# Patient Record
Sex: MALE | Race: WHITE | NOT HISPANIC OR LATINO | URBAN - METROPOLITAN AREA
[De-identification: names, ages, dates, MRNs, and addresses within clinical notes are randomized per-mention and may not be internally consistent; named-entity substitution may affect disease eponyms.]

---

## 2017-01-24 ENCOUNTER — OUTPATIENT (OUTPATIENT)
Dept: OUTPATIENT SERVICES | Facility: HOSPITAL | Age: 72
LOS: 1 days | End: 2017-01-24
Payer: MEDICARE

## 2017-01-24 ENCOUNTER — APPOINTMENT (OUTPATIENT)
Dept: RADIOLOGY | Facility: HOSPITAL | Age: 72
End: 2017-01-24

## 2017-01-24 DIAGNOSIS — R06.2 WHEEZING: ICD-10-CM

## 2017-01-24 DIAGNOSIS — R05 COUGH: ICD-10-CM

## 2017-01-24 PROCEDURE — 71046 X-RAY EXAM CHEST 2 VIEWS: CPT

## 2017-01-24 PROCEDURE — 71020: CPT | Mod: 26

## 2017-03-16 ENCOUNTER — OUTPATIENT (OUTPATIENT)
Dept: OUTPATIENT SERVICES | Facility: HOSPITAL | Age: 72
LOS: 1 days | End: 2017-03-16
Payer: MEDICARE

## 2017-03-16 ENCOUNTER — APPOINTMENT (OUTPATIENT)
Dept: CT IMAGING | Facility: HOSPITAL | Age: 72
End: 2017-03-16

## 2017-03-16 DIAGNOSIS — J44.9 CHRONIC OBSTRUCTIVE PULMONARY DISEASE, UNSPECIFIED: ICD-10-CM

## 2017-03-16 PROCEDURE — 71250 CT THORAX DX C-: CPT

## 2018-04-17 ENCOUNTER — INPATIENT (INPATIENT)
Facility: HOSPITAL | Age: 73
LOS: 1 days | Discharge: HOME | DRG: 194 | End: 2018-04-19
Attending: INTERNAL MEDICINE | Admitting: HOSPITALIST
Payer: MEDICARE

## 2018-04-17 DIAGNOSIS — J44.1 CHRONIC OBSTRUCTIVE PULMONARY DISEASE WITH (ACUTE) EXACERBATION: ICD-10-CM

## 2018-04-17 DIAGNOSIS — J10.1 INFLUENZA DUE TO OTHER IDENTIFIED INFLUENZA VIRUS WITH OTHER RESPIRATORY MANIFESTATIONS: ICD-10-CM

## 2018-04-17 DIAGNOSIS — K51.90 ULCERATIVE COLITIS, UNSPECIFIED, WITHOUT COMPLICATIONS: ICD-10-CM

## 2018-04-17 DIAGNOSIS — Z87.891 PERSONAL HISTORY OF NICOTINE DEPENDENCE: ICD-10-CM

## 2018-04-17 DIAGNOSIS — R73.9 HYPERGLYCEMIA, UNSPECIFIED: ICD-10-CM

## 2018-04-17 DIAGNOSIS — G62.9 POLYNEUROPATHY, UNSPECIFIED: ICD-10-CM

## 2018-04-17 DIAGNOSIS — I10 ESSENTIAL (PRIMARY) HYPERTENSION: ICD-10-CM

## 2018-04-17 DIAGNOSIS — N40.0 BENIGN PROSTATIC HYPERPLASIA WITHOUT LOWER URINARY TRACT SYMPTOMS: ICD-10-CM

## 2018-04-17 DIAGNOSIS — E87.6 HYPOKALEMIA: ICD-10-CM

## 2018-04-17 PROCEDURE — 93010 ELECTROCARDIOGRAM REPORT: CPT

## 2018-04-17 PROCEDURE — 71045 X-RAY EXAM CHEST 1 VIEW: CPT | Mod: 26

## 2018-04-17 PROCEDURE — 99223 1ST HOSP IP/OBS HIGH 75: CPT

## 2018-04-18 PROCEDURE — 99233 SBSQ HOSP IP/OBS HIGH 50: CPT

## 2018-04-19 PROCEDURE — 80048 BASIC METABOLIC PNL TOTAL CA: CPT

## 2018-04-19 PROCEDURE — 99239 HOSP IP/OBS DSCHRG MGMT >30: CPT

## 2018-04-19 PROCEDURE — 85027 COMPLETE CBC AUTOMATED: CPT

## 2018-04-19 PROCEDURE — 85025 COMPLETE CBC W/AUTO DIFF WBC: CPT

## 2018-04-19 PROCEDURE — 87040 BLOOD CULTURE FOR BACTERIA: CPT

## 2018-04-19 PROCEDURE — 96374 THER/PROPH/DIAG INJ IV PUSH: CPT

## 2018-04-19 PROCEDURE — 80076 HEPATIC FUNCTION PANEL: CPT

## 2018-04-19 PROCEDURE — 93005 ELECTROCARDIOGRAM TRACING: CPT

## 2018-04-19 PROCEDURE — 85610 PROTHROMBIN TIME: CPT

## 2018-04-19 PROCEDURE — 83880 ASSAY OF NATRIURETIC PEPTIDE: CPT

## 2018-04-19 PROCEDURE — 82550 ASSAY OF CK (CPK): CPT

## 2018-04-19 PROCEDURE — 94640 AIRWAY INHALATION TREATMENT: CPT

## 2018-04-19 PROCEDURE — 82803 BLOOD GASES ANY COMBINATION: CPT

## 2018-04-19 PROCEDURE — 87486 CHLMYD PNEUM DNA AMP PROBE: CPT

## 2018-04-19 PROCEDURE — 87400 INFLUENZA A/B EACH AG IA: CPT

## 2018-04-19 PROCEDURE — 85730 THROMBOPLASTIN TIME PARTIAL: CPT

## 2018-04-19 PROCEDURE — 87581 M.PNEUMON DNA AMP PROBE: CPT

## 2018-04-19 PROCEDURE — 82553 CREATINE MB FRACTION: CPT

## 2018-04-19 PROCEDURE — 97161 PT EVAL LOW COMPLEX 20 MIN: CPT

## 2018-04-19 PROCEDURE — 80053 COMPREHEN METABOLIC PANEL: CPT

## 2018-04-19 PROCEDURE — 99285 EMERGENCY DEPT VISIT HI MDM: CPT | Mod: 25

## 2018-04-19 PROCEDURE — 71045 X-RAY EXAM CHEST 1 VIEW: CPT

## 2018-04-19 PROCEDURE — 84484 ASSAY OF TROPONIN QUANT: CPT

## 2018-04-19 PROCEDURE — 96361 HYDRATE IV INFUSION ADD-ON: CPT

## 2018-04-19 PROCEDURE — 96375 TX/PRO/DX INJ NEW DRUG ADDON: CPT

## 2018-04-19 PROCEDURE — 87633 RESP VIRUS 12-25 TARGETS: CPT

## 2018-05-03 ENCOUNTER — OUTPATIENT (OUTPATIENT)
Dept: OUTPATIENT SERVICES | Facility: HOSPITAL | Age: 73
LOS: 1 days | End: 2018-05-03
Payer: MEDICARE

## 2018-05-03 ENCOUNTER — APPOINTMENT (OUTPATIENT)
Dept: CT IMAGING | Facility: HOSPITAL | Age: 73
End: 2018-05-03
Payer: MEDICARE

## 2018-05-03 DIAGNOSIS — I70.0 ATHEROSCLEROSIS OF AORTA: ICD-10-CM

## 2018-05-03 DIAGNOSIS — R91.1 SOLITARY PULMONARY NODULE: ICD-10-CM

## 2018-05-03 DIAGNOSIS — J43.2 CENTRILOBULAR EMPHYSEMA: ICD-10-CM

## 2018-05-03 DIAGNOSIS — J43.9 EMPHYSEMA, UNSPECIFIED: ICD-10-CM

## 2018-05-03 PROCEDURE — 71250 CT THORAX DX C-: CPT

## 2018-05-03 PROCEDURE — 71250 CT THORAX DX C-: CPT | Mod: 26

## 2019-05-22 ENCOUNTER — APPOINTMENT (OUTPATIENT)
Dept: CARDIOLOGY | Facility: CLINIC | Age: 74
End: 2019-05-22
Payer: MEDICARE

## 2019-05-22 VITALS
WEIGHT: 198 LBS | SYSTOLIC BLOOD PRESSURE: 112 MMHG | BODY MASS INDEX: 26.82 KG/M2 | OXYGEN SATURATION: 94 % | HEIGHT: 72 IN | RESPIRATION RATE: 17 BRPM | DIASTOLIC BLOOD PRESSURE: 71 MMHG | HEART RATE: 78 BPM

## 2019-05-22 DIAGNOSIS — M19.90 UNSPECIFIED OSTEOARTHRITIS, UNSPECIFIED SITE: ICD-10-CM

## 2019-05-22 DIAGNOSIS — Z78.9 OTHER SPECIFIED HEALTH STATUS: ICD-10-CM

## 2019-05-22 DIAGNOSIS — K81.9 CHOLECYSTITIS, UNSPECIFIED: ICD-10-CM

## 2019-05-22 DIAGNOSIS — I83.90 ASYMPTOMATIC VARICOSE VEINS OF UNSPECIFIED LOWER EXTREMITY: ICD-10-CM

## 2019-05-22 DIAGNOSIS — N40.0 BENIGN PROSTATIC HYPERPLASIA WITHOUT LOWER URINARY TRACT SYMPMS: ICD-10-CM

## 2019-05-22 DIAGNOSIS — Z86.69 PERSONAL HISTORY OF OTHER DISEASES OF THE NERVOUS SYSTEM AND SENSE ORGANS: ICD-10-CM

## 2019-05-22 DIAGNOSIS — Z87.09 PERSONAL HISTORY OF OTHER DISEASES OF THE RESPIRATORY SYSTEM: ICD-10-CM

## 2019-05-22 DIAGNOSIS — K52.9 NONINFECTIVE GASTROENTERITIS AND COLITIS, UNSPECIFIED: ICD-10-CM

## 2019-05-22 DIAGNOSIS — K82.9 DISEASE OF GALLBLADDER, UNSPECIFIED: ICD-10-CM

## 2019-05-22 DIAGNOSIS — Z87.891 PERSONAL HISTORY OF NICOTINE DEPENDENCE: ICD-10-CM

## 2019-05-22 PROCEDURE — 93000 ELECTROCARDIOGRAM COMPLETE: CPT

## 2019-05-22 PROCEDURE — 99204 OFFICE O/P NEW MOD 45 MIN: CPT

## 2019-05-27 PROBLEM — M19.90 ARTHRITIS: Status: ACTIVE | Noted: 2019-05-27

## 2019-05-27 PROBLEM — Z87.891 FORMER SMOKER: Status: ACTIVE | Noted: 2019-05-27

## 2019-05-27 PROBLEM — K81.9 ACALCULOUS CHOLECYSTITIS: Status: RESOLVED | Noted: 2019-05-27 | Resolved: 2019-05-27

## 2019-05-27 PROBLEM — Z87.09 HISTORY OF INFLUENZA: Status: RESOLVED | Noted: 2019-05-27 | Resolved: 2019-05-27

## 2019-05-27 PROBLEM — Z78.9 SOCIAL ALCOHOL USE: Status: ACTIVE | Noted: 2019-05-27

## 2019-05-27 PROBLEM — N40.0 PROSTATE ENLARGEMENT: Status: ACTIVE | Noted: 2019-05-27

## 2019-05-27 PROBLEM — K52.9 COLITIS: Status: ACTIVE | Noted: 2019-05-27

## 2019-05-27 PROBLEM — K82.9 GALLBLADDER DISEASE: Status: ACTIVE | Noted: 2019-05-27

## 2019-05-27 PROBLEM — I83.90 VARICOSE VEINS: Status: ACTIVE | Noted: 2019-05-27

## 2019-05-27 PROBLEM — Z86.69 HISTORY OF STRABISMUS: Status: RESOLVED | Noted: 2019-05-27 | Resolved: 2019-05-27

## 2019-05-27 RX ORDER — ALBUTEROL SULFATE 90 UG/1
108 (90 BASE) AEROSOL, METERED RESPIRATORY (INHALATION)
Refills: 0 | Status: ACTIVE | COMMUNITY
Start: 2019-05-27

## 2019-05-27 RX ORDER — FLUTICASONE PROPIONATE AND SALMETEROL 50; 250 UG/1; UG/1
250-50 POWDER RESPIRATORY (INHALATION)
Refills: 0 | Status: ACTIVE | COMMUNITY
Start: 2019-05-27

## 2019-05-27 RX ORDER — GABAPENTIN 300 MG/1
300 CAPSULE ORAL
Refills: 0 | Status: ACTIVE | COMMUNITY
Start: 2019-05-27

## 2019-05-27 RX ORDER — MESALAMINE 375 MG/1
0.38 CAPSULE, EXTENDED RELEASE ORAL DAILY
Refills: 0 | Status: ACTIVE | COMMUNITY
Start: 2019-05-27

## 2019-05-27 RX ORDER — PNV NO.95/FERROUS FUM/FOLIC AC 28MG-0.8MG
100 TABLET ORAL
Refills: 0 | Status: ACTIVE | COMMUNITY
Start: 2019-05-27

## 2019-05-27 RX ORDER — TAMSULOSIN HYDROCHLORIDE 0.4 MG/1
0.4 CAPSULE ORAL
Refills: 0 | Status: ACTIVE | COMMUNITY
Start: 2019-05-27

## 2019-05-27 RX ORDER — TIOTROPIUM BROMIDE 18 UG/1
18 CAPSULE ORAL; RESPIRATORY (INHALATION) DAILY
Qty: 30 | Refills: 1 | Status: ACTIVE | COMMUNITY
Start: 2019-05-27

## 2019-05-27 RX ORDER — BUPROPION HYDROCHLORIDE 150 MG/1
150 TABLET, FILM COATED ORAL
Refills: 0 | Status: DISCONTINUED | COMMUNITY
End: 2019-05-27

## 2019-05-27 RX ORDER — ERGOCALCIFEROL 1.25 MG/1
1.25 MG CAPSULE, LIQUID FILLED ORAL
Refills: 0 | Status: ACTIVE | COMMUNITY
Start: 2019-05-27

## 2019-05-27 RX ORDER — SACCHAROMYCES BOULARDII 50 MG
250 CAPSULE ORAL
Refills: 0 | Status: ACTIVE | COMMUNITY
Start: 2019-05-27

## 2019-05-27 RX ORDER — DICYCLOMINE HYDROCHLORIDE 10 MG/1
10 CAPSULE ORAL
Refills: 0 | Status: ACTIVE | COMMUNITY
Start: 2019-05-27

## 2019-05-27 NOTE — PHYSICAL EXAM
[General Appearance - Well Developed] : well developed [Normal Appearance] : normal appearance [Well Groomed] : well groomed [General Appearance - Well Nourished] : well nourished [No Deformities] : no deformities [General Appearance - In No Acute Distress] : no acute distress [Normal Conjunctiva] : the conjunctiva exhibited no abnormalities [Eyelids - No Xanthelasma] : the eyelids demonstrated no xanthelasmas [Normal Oral Mucosa] : normal oral mucosa [No Oral Pallor] : no oral pallor [No Oral Cyanosis] : no oral cyanosis [Normal Jugular Venous A Waves Present] : normal jugular venous A waves present [Normal Jugular Venous V Waves Present] : normal jugular venous V waves present [No Jugular Venous Gracia A Waves] : no jugular venous gracia A waves [Heart Rate And Rhythm] : heart rate and rhythm were normal [Heart Sounds] : normal S1 and S2 [Murmurs] : no murmurs present [Exaggerated Use Of Accessory Muscles For Inspiration] : no accessory muscle use [Respiration, Rhythm And Depth] : normal respiratory rhythm and effort [Auscultation Breath Sounds / Voice Sounds] : lungs were clear to auscultation bilaterally [Abdomen Soft] : soft [Abdomen Tenderness] : non-tender [Abdomen Mass (___ Cm)] : no abdominal mass palpated [Abnormal Walk] : normal gait [Gait - Sufficient For Exercise Testing] : the gait was sufficient for exercise testing [Nail Clubbing] : no clubbing of the fingernails [Cyanosis, Localized] : no localized cyanosis [Petechial Hemorrhages (___cm)] : no petechial hemorrhages [Skin Color & Pigmentation] : normal skin color and pigmentation [] : no rash [No Venous Stasis] : no venous stasis [Skin Lesions] : no skin lesions [No Skin Ulcers] : no skin ulcer [No Xanthoma] : no  xanthoma was observed [Oriented To Time, Place, And Person] : oriented to person, place, and time [Affect] : the affect was normal [Mood] : the mood was normal [No Anxiety] : not feeling anxious

## 2019-05-31 ENCOUNTER — NON-APPOINTMENT (OUTPATIENT)
Age: 74
End: 2019-05-31

## 2019-05-31 PROCEDURE — 93224 XTRNL ECG REC UP TO 48 HRS: CPT

## 2019-06-03 DIAGNOSIS — I49.3 VENTRICULAR PREMATURE DEPOLARIZATION: ICD-10-CM

## 2019-06-03 DIAGNOSIS — R91.1 SOLITARY PULMONARY NODULE: ICD-10-CM

## 2019-06-03 DIAGNOSIS — M48.00 SPINAL STENOSIS, SITE UNSPECIFIED: ICD-10-CM

## 2019-06-03 DIAGNOSIS — M48.061 SPINAL STENOSIS, LUMBAR REGION WITHOUT NEUROGENIC CLAUDICATION: ICD-10-CM

## 2019-06-03 DIAGNOSIS — I47.1 SUPRAVENTRICULAR TACHYCARDIA: ICD-10-CM

## 2019-06-03 NOTE — ASSESSMENT
[FreeTextEntry1] : A Holter monitor as ordered. He is orthostatic today. There was a 15 point drop in  Bp 112 systolic to 97 systolic and a trial of Florinef is instituted. Flomax may be contributory as well as age and autonomic dysfunction.   I also liberalized the salt in the diet and encouraged fluids. \par \par medical necessity\par this is a high risk encounter based upon a new symptom of dizziness with orthostasis that requires medical and lifestyle management and a review of prior records.

## 2019-06-03 NOTE — REASON FOR VISIT
[Consultation] : a consultation regarding [Dizziness] : dizziness [Medication Management] : Medication management [FreeTextEntry1] : Eligio comes self referred  today mostly with complaints of dizziness. He thought it might be age related. His wife and his daughter were worried and encouraged her to make a cardiac consultation . Over the past 9 years, he has seen Dr. Marion. He underwent a nuclear perfusion imaging one year ago and has cardiac risk in the form of a 40-pack-year history of smoking. he underwent coronary angiography 5-10 years ago . He comes today for clarification of his  overall cardiovascular risk. He was advised to undergo a complete cardiac evaluation. He denies chest pains shortness of breath or loss of consciousness.

## 2019-06-05 ENCOUNTER — APPOINTMENT (OUTPATIENT)
Dept: CARDIOLOGY | Facility: CLINIC | Age: 74
End: 2019-06-05

## 2019-06-21 ENCOUNTER — NON-APPOINTMENT (OUTPATIENT)
Age: 74
End: 2019-06-21

## 2019-06-21 ENCOUNTER — APPOINTMENT (OUTPATIENT)
Dept: CARDIOLOGY | Facility: CLINIC | Age: 74
End: 2019-06-21
Payer: MEDICARE

## 2019-06-21 VITALS
HEART RATE: 61 BPM | HEIGHT: 72 IN | DIASTOLIC BLOOD PRESSURE: 74 MMHG | RESPIRATION RATE: 16 BRPM | BODY MASS INDEX: 27.22 KG/M2 | SYSTOLIC BLOOD PRESSURE: 142 MMHG | OXYGEN SATURATION: 94 % | WEIGHT: 201 LBS

## 2019-06-21 DIAGNOSIS — I65.29 OCCLUSION AND STENOSIS OF UNSPECIFIED CAROTID ARTERY: ICD-10-CM

## 2019-06-21 PROCEDURE — 93000 ELECTROCARDIOGRAM COMPLETE: CPT

## 2019-06-21 PROCEDURE — 99214 OFFICE O/P EST MOD 30 MIN: CPT

## 2019-06-24 PROBLEM — I65.29 CAROTID STENOSIS: Status: ACTIVE | Noted: 2019-06-03

## 2019-06-24 NOTE — PHYSICAL EXAM
[General Appearance - Well Developed] : well developed [Normal Appearance] : normal appearance [Well Groomed] : well groomed [General Appearance - Well Nourished] : well nourished [Normal Conjunctiva] : the conjunctiva exhibited no abnormalities [No Deformities] : no deformities [General Appearance - In No Acute Distress] : no acute distress [No Oral Pallor] : no oral pallor [Eyelids - No Xanthelasma] : the eyelids demonstrated no xanthelasmas [Normal Oral Mucosa] : normal oral mucosa [Normal Jugular Venous A Waves Present] : normal jugular venous A waves present [No Oral Cyanosis] : no oral cyanosis [Normal Jugular Venous V Waves Present] : normal jugular venous V waves present [No Jugular Venous Gracia A Waves] : no jugular venous gracia A waves [Respiration, Rhythm And Depth] : normal respiratory rhythm and effort [Heart Rate And Rhythm] : heart rate and rhythm were normal [Auscultation Breath Sounds / Voice Sounds] : lungs were clear to auscultation bilaterally [Exaggerated Use Of Accessory Muscles For Inspiration] : no accessory muscle use [Abdomen Soft] : soft [Murmurs] : no murmurs present [Heart Sounds] : normal S1 and S2 [Abdomen Tenderness] : non-tender [Abdomen Mass (___ Cm)] : no abdominal mass palpated [Nail Clubbing] : no clubbing of the fingernails [Abnormal Walk] : normal gait [Gait - Sufficient For Exercise Testing] : the gait was sufficient for exercise testing [Petechial Hemorrhages (___cm)] : no petechial hemorrhages [Cyanosis, Localized] : no localized cyanosis [No Venous Stasis] : no venous stasis [Skin Color & Pigmentation] : normal skin color and pigmentation [] : no rash [Skin Lesions] : no skin lesions [No Skin Ulcers] : no skin ulcer [No Xanthoma] : no  xanthoma was observed [Oriented To Time, Place, And Person] : oriented to person, place, and time [No Anxiety] : not feeling anxious [Mood] : the mood was normal [Affect] : the affect was normal

## 2019-06-24 NOTE — REASON FOR VISIT
[Follow-Up - Clinic] : a clinic follow-up of [Medication Management] : Medication management [FreeTextEntry1] : SADA feels dramatically better on Florinef . however he still has orthostasis.  suspect the Florinef has increased blood pressure making him less symptomatic. we would consider th addition of midodrine as necessary. we discussed th association of orthostasis with other neurologic issue. we have reviewed studies form dr Marion office. - 110 systolic.

## 2019-06-24 NOTE — DISCUSSION/SUMMARY
[FreeTextEntry4] : orthostasis improved consider addition of midodrine consider pad as alternative explanation consinue antiplateltes

## 2019-07-17 ENCOUNTER — RX RENEWAL (OUTPATIENT)
Age: 74
End: 2019-07-17

## 2019-07-24 ENCOUNTER — APPOINTMENT (OUTPATIENT)
Dept: CARDIOLOGY | Facility: CLINIC | Age: 74
End: 2019-07-24
Payer: MEDICARE

## 2019-07-24 ENCOUNTER — NON-APPOINTMENT (OUTPATIENT)
Age: 74
End: 2019-07-24

## 2019-07-24 VITALS
WEIGHT: 198 LBS | HEIGHT: 74 IN | RESPIRATION RATE: 16 BRPM | BODY MASS INDEX: 25.41 KG/M2 | HEART RATE: 63 BPM | SYSTOLIC BLOOD PRESSURE: 134 MMHG | OXYGEN SATURATION: 96 % | DIASTOLIC BLOOD PRESSURE: 78 MMHG

## 2019-07-24 DIAGNOSIS — I73.9 PERIPHERAL VASCULAR DISEASE, UNSPECIFIED: ICD-10-CM

## 2019-07-24 PROCEDURE — 93000 ELECTROCARDIOGRAM COMPLETE: CPT

## 2019-07-24 PROCEDURE — 99214 OFFICE O/P EST MOD 30 MIN: CPT

## 2019-07-28 PROBLEM — I73.9 PERIPHERAL ARTERIAL DISEASE: Status: ACTIVE | Noted: 2019-07-24

## 2019-07-28 NOTE — DISCUSSION/SUMMARY
[Peripheral Vascular Disease] : peripheral vascular disease [Not Responding to Treatment] : not responding to treatment [de-identified] : carotid dopplers [FreeTextEntry4] : orthostatic hypotension controlled consider midodrine if refractory.

## 2019-07-28 NOTE — PHYSICAL EXAM
[General Appearance - Well Developed] : well developed [Normal Appearance] : normal appearance [Well Groomed] : well groomed [General Appearance - Well Nourished] : well nourished [No Deformities] : no deformities [General Appearance - In No Acute Distress] : no acute distress [Eyelids - No Xanthelasma] : the eyelids demonstrated no xanthelasmas [Normal Conjunctiva] : the conjunctiva exhibited no abnormalities [Normal Oral Mucosa] : normal oral mucosa [No Oral Pallor] : no oral pallor [No Oral Cyanosis] : no oral cyanosis [No Jugular Venous Gracia A Waves] : no jugular venous gracia A waves [Normal Jugular Venous V Waves Present] : normal jugular venous V waves present [Normal Jugular Venous A Waves Present] : normal jugular venous A waves present [Respiration, Rhythm And Depth] : normal respiratory rhythm and effort [Exaggerated Use Of Accessory Muscles For Inspiration] : no accessory muscle use [Auscultation Breath Sounds / Voice Sounds] : lungs were clear to auscultation bilaterally [Heart Rate And Rhythm] : heart rate and rhythm were normal [Murmurs] : no murmurs present [Heart Sounds] : normal S1 and S2 [Abdomen Tenderness] : non-tender [Abdomen Soft] : soft [Abnormal Walk] : normal gait [Abdomen Mass (___ Cm)] : no abdominal mass palpated [Nail Clubbing] : no clubbing of the fingernails [Gait - Sufficient For Exercise Testing] : the gait was sufficient for exercise testing [Cyanosis, Localized] : no localized cyanosis [Petechial Hemorrhages (___cm)] : no petechial hemorrhages [Skin Color & Pigmentation] : normal skin color and pigmentation [] : no rash [No Skin Ulcers] : no skin ulcer [Skin Lesions] : no skin lesions [No Venous Stasis] : no venous stasis [No Xanthoma] : no  xanthoma was observed [Oriented To Time, Place, And Person] : oriented to person, place, and time [Affect] : the affect was normal [Mood] : the mood was normal [No Anxiety] : not feeling anxious

## 2019-07-28 NOTE — REASON FOR VISIT
[Follow-Up - Clinic] : a clinic follow-up of [Medication Management] : Medication management [Peripheral Vascular Disease] : peripheral vascular disease [FreeTextEntry1] : Autumn is doing well on current medical management

## 2019-08-20 ENCOUNTER — APPOINTMENT (OUTPATIENT)
Dept: CARDIOLOGY | Facility: CLINIC | Age: 74
End: 2019-08-20
Payer: MEDICARE

## 2019-08-20 PROCEDURE — 93880 EXTRACRANIAL BILAT STUDY: CPT

## 2019-09-12 ENCOUNTER — RX RENEWAL (OUTPATIENT)
Age: 74
End: 2019-09-12

## 2019-10-25 ENCOUNTER — APPOINTMENT (OUTPATIENT)
Dept: CARDIOLOGY | Facility: CLINIC | Age: 74
End: 2019-10-25
Payer: MEDICARE

## 2019-10-25 ENCOUNTER — NON-APPOINTMENT (OUTPATIENT)
Age: 74
End: 2019-10-25

## 2019-10-25 VITALS
OXYGEN SATURATION: 97 % | HEART RATE: 75 BPM | BODY MASS INDEX: 25.41 KG/M2 | WEIGHT: 198 LBS | HEIGHT: 74 IN | DIASTOLIC BLOOD PRESSURE: 76 MMHG | SYSTOLIC BLOOD PRESSURE: 146 MMHG | RESPIRATION RATE: 16 BRPM

## 2019-10-25 DIAGNOSIS — I77.9 DISORDER OF ARTERIES AND ARTERIOLES, UNSPECIFIED: ICD-10-CM

## 2019-10-25 DIAGNOSIS — R42 DIZZINESS AND GIDDINESS: ICD-10-CM

## 2019-10-25 DIAGNOSIS — E78.5 HYPERLIPIDEMIA, UNSPECIFIED: ICD-10-CM

## 2019-10-25 PROCEDURE — 93000 ELECTROCARDIOGRAM COMPLETE: CPT

## 2019-10-25 PROCEDURE — 99214 OFFICE O/P EST MOD 30 MIN: CPT

## 2019-11-03 PROBLEM — E78.5 HYPERLIPIDEMIA: Status: ACTIVE | Noted: 2019-11-03

## 2019-11-03 PROBLEM — R42 ORTHOSTATIC DIZZINESS: Status: ACTIVE | Noted: 2019-05-22

## 2019-11-03 PROBLEM — I77.9 BILATERAL CAROTID ARTERY DISEASE: Status: ACTIVE | Noted: 2019-06-24

## 2019-11-03 NOTE — DISCUSSION/SUMMARY
[Peripheral Vascular Disease] : peripheral vascular disease [Not Responding to Treatment] : not responding to treatment [Hyperlipidemia] : hyperlipidemia [Medication Changes Per Orders] : as documented in orders [de-identified] : begin a statin and aspirin [de-identified] : 75 [de-identified] : 54 [FreeTextEntry1] : 1/17/19 [de-identified] : carotid dopplers [FreeTextEntry4] : orthostatic hypotension controlled consider midodrine if refractory.

## 2019-11-03 NOTE — PHYSICAL EXAM
[General Appearance - Well Developed] : well developed [Normal Appearance] : normal appearance [Well Groomed] : well groomed [General Appearance - Well Nourished] : well nourished [No Deformities] : no deformities [General Appearance - In No Acute Distress] : no acute distress [Normal Conjunctiva] : the conjunctiva exhibited no abnormalities [Eyelids - No Xanthelasma] : the eyelids demonstrated no xanthelasmas [Normal Oral Mucosa] : normal oral mucosa [No Oral Pallor] : no oral pallor [No Oral Cyanosis] : no oral cyanosis [Normal Jugular Venous A Waves Present] : normal jugular venous A waves present [Normal Jugular Venous V Waves Present] : normal jugular venous V waves present [No Jugular Venous Gracia A Waves] : no jugular venous gracia A waves [Respiration, Rhythm And Depth] : normal respiratory rhythm and effort [Exaggerated Use Of Accessory Muscles For Inspiration] : no accessory muscle use [Auscultation Breath Sounds / Voice Sounds] : lungs were clear to auscultation bilaterally [Heart Rate And Rhythm] : heart rate and rhythm were normal [Heart Sounds] : normal S1 and S2 [Murmurs] : no murmurs present [Abdomen Soft] : soft [Abdomen Tenderness] : non-tender [Abdomen Mass (___ Cm)] : no abdominal mass palpated [Abnormal Walk] : normal gait [Gait - Sufficient For Exercise Testing] : the gait was sufficient for exercise testing [Nail Clubbing] : no clubbing of the fingernails [Cyanosis, Localized] : no localized cyanosis [Petechial Hemorrhages (___cm)] : no petechial hemorrhages [Skin Color & Pigmentation] : normal skin color and pigmentation [] : no rash [No Venous Stasis] : no venous stasis [Skin Lesions] : no skin lesions [No Skin Ulcers] : no skin ulcer [No Xanthoma] : no  xanthoma was observed [Oriented To Time, Place, And Person] : oriented to person, place, and time [Affect] : the affect was normal [Mood] : the mood was normal [No Anxiety] : not feeling anxious

## 2019-12-15 ENCOUNTER — RX RENEWAL (OUTPATIENT)
Age: 74
End: 2019-12-15

## 2019-12-18 ENCOUNTER — APPOINTMENT (OUTPATIENT)
Dept: CARDIOLOGY | Facility: CLINIC | Age: 74
End: 2019-12-18

## 2020-02-07 ENCOUNTER — INPATIENT (INPATIENT)
Facility: HOSPITAL | Age: 75
LOS: 6 days | Discharge: HOME | DRG: 871 | End: 2020-02-14
Attending: FAMILY MEDICINE | Admitting: INTERNAL MEDICINE
Payer: MEDICARE

## 2020-02-07 VITALS
WEIGHT: 192.9 LBS | SYSTOLIC BLOOD PRESSURE: 128 MMHG | HEIGHT: 72 IN | DIASTOLIC BLOOD PRESSURE: 74 MMHG | HEART RATE: 90 BPM | TEMPERATURE: 98 F | RESPIRATION RATE: 16 BRPM | OXYGEN SATURATION: 93 %

## 2020-02-07 DIAGNOSIS — Z98.890 OTHER SPECIFIED POSTPROCEDURAL STATES: Chronic | ICD-10-CM

## 2020-02-07 DIAGNOSIS — Z90.49 ACQUIRED ABSENCE OF OTHER SPECIFIED PARTS OF DIGESTIVE TRACT: Chronic | ICD-10-CM

## 2020-02-07 DIAGNOSIS — J18.9 PNEUMONIA, UNSPECIFIED ORGANISM: ICD-10-CM

## 2020-02-07 LAB
ALBUMIN SERPL ELPH-MCNC: 3 G/DL — LOW (ref 3.3–5)
ALP SERPL-CCNC: 76 U/L — SIGNIFICANT CHANGE UP (ref 40–120)
ALT FLD-CCNC: 16 U/L — SIGNIFICANT CHANGE UP (ref 10–45)
ANION GAP SERPL CALC-SCNC: 10 MMOL/L — SIGNIFICANT CHANGE UP (ref 5–17)
APPEARANCE UR: CLEAR — SIGNIFICANT CHANGE UP
AST SERPL-CCNC: 9 U/L — LOW (ref 10–40)
BACTERIA # UR AUTO: NEGATIVE /HPF — SIGNIFICANT CHANGE UP
BILIRUB SERPL-MCNC: 1.4 MG/DL — HIGH (ref 0.2–1.2)
BILIRUB UR-MCNC: NEGATIVE — SIGNIFICANT CHANGE UP
BUN SERPL-MCNC: 17 MG/DL — SIGNIFICANT CHANGE UP (ref 7–23)
CALCIUM SERPL-MCNC: 8.8 MG/DL — SIGNIFICANT CHANGE UP (ref 8.4–10.5)
CHLORIDE SERPL-SCNC: 106 MMOL/L — SIGNIFICANT CHANGE UP (ref 96–108)
CO2 BLDA-SCNC: 25 MMOL/L — SIGNIFICANT CHANGE UP (ref 22–30)
CO2 SERPL-SCNC: 28 MMOL/L — SIGNIFICANT CHANGE UP (ref 22–31)
COLOR SPEC: YELLOW — SIGNIFICANT CHANGE UP
COMMENT - URINE: SIGNIFICANT CHANGE UP
CREAT SERPL-MCNC: 1.16 MG/DL — SIGNIFICANT CHANGE UP (ref 0.5–1.3)
DIFF PNL FLD: NEGATIVE — SIGNIFICANT CHANGE UP
EPI CELLS # UR: SIGNIFICANT CHANGE UP
FLU A RESULT: SIGNIFICANT CHANGE UP
FLU A RESULT: SIGNIFICANT CHANGE UP
FLUAV AG NPH QL: SIGNIFICANT CHANGE UP
FLUBV AG NPH QL: SIGNIFICANT CHANGE UP
GLUCOSE SERPL-MCNC: 147 MG/DL — HIGH (ref 70–99)
GLUCOSE UR QL: 50 MG/DL
HCT VFR BLD CALC: 40.6 % — SIGNIFICANT CHANGE UP (ref 39–50)
HGB BLD-MCNC: 13.9 G/DL — SIGNIFICANT CHANGE UP (ref 13–17)
HOROWITZ INDEX BLDA+IHG-RTO: SIGNIFICANT CHANGE UP
KETONES UR-MCNC: ABNORMAL
LACTATE SERPL-SCNC: 1.1 MMOL/L — SIGNIFICANT CHANGE UP (ref 0.7–2)
LEUKOCYTE ESTERASE UR-ACNC: NEGATIVE — SIGNIFICANT CHANGE UP
MCHC RBC-ENTMCNC: 31.5 PG — SIGNIFICANT CHANGE UP (ref 27–34)
MCHC RBC-ENTMCNC: 34.2 GM/DL — SIGNIFICANT CHANGE UP (ref 32–36)
MCV RBC AUTO: 92.1 FL — SIGNIFICANT CHANGE UP (ref 80–100)
NITRITE UR-MCNC: NEGATIVE — SIGNIFICANT CHANGE UP
NRBC # BLD: 0 /100 WBCS — SIGNIFICANT CHANGE UP (ref 0–0)
PCO2 BLDA: 36 MMHG — SIGNIFICANT CHANGE UP (ref 32–46)
PH BLDA: 7.45 — SIGNIFICANT CHANGE UP (ref 7.35–7.45)
PH UR: 6 — SIGNIFICANT CHANGE UP (ref 5–8)
PLATELET # BLD AUTO: 133 K/UL — LOW (ref 150–400)
PO2 BLDA: 69 MMHG — LOW (ref 74–108)
POTASSIUM SERPL-MCNC: 3.4 MMOL/L — LOW (ref 3.5–5.3)
POTASSIUM SERPL-SCNC: 3.4 MMOL/L — LOW (ref 3.5–5.3)
PROT SERPL-MCNC: 6.6 G/DL — SIGNIFICANT CHANGE UP (ref 6–8.3)
PROT UR-MCNC: 15
RBC # BLD: 4.41 M/UL — SIGNIFICANT CHANGE UP (ref 4.2–5.8)
RBC # FLD: 13.4 % — SIGNIFICANT CHANGE UP (ref 10.3–14.5)
RBC CASTS # UR COMP ASSIST: NEGATIVE /HPF — SIGNIFICANT CHANGE UP (ref 0–4)
RSV RESULT: SIGNIFICANT CHANGE UP
RSV RNA RESP QL NAA+PROBE: SIGNIFICANT CHANGE UP
SAO2 % BLDA: 94 % — SIGNIFICANT CHANGE UP (ref 92–96)
SODIUM SERPL-SCNC: 144 MMOL/L — SIGNIFICANT CHANGE UP (ref 135–145)
SP GR SPEC: 1.02 — SIGNIFICANT CHANGE UP (ref 1.01–1.02)
TROPONIN I SERPL-MCNC: <.017 NG/ML — LOW (ref 0.02–0.06)
UROBILINOGEN FLD QL: NEGATIVE — SIGNIFICANT CHANGE UP
WBC # BLD: 16.24 K/UL — HIGH (ref 3.8–10.5)
WBC # FLD AUTO: 16.24 K/UL — HIGH (ref 3.8–10.5)
WBC UR QL: SIGNIFICANT CHANGE UP /HPF (ref 0–5)

## 2020-02-07 PROCEDURE — 99285 EMERGENCY DEPT VISIT HI MDM: CPT

## 2020-02-07 PROCEDURE — 99223 1ST HOSP IP/OBS HIGH 75: CPT | Mod: AI

## 2020-02-07 PROCEDURE — 93010 ELECTROCARDIOGRAM REPORT: CPT

## 2020-02-07 PROCEDURE — 71045 X-RAY EXAM CHEST 1 VIEW: CPT | Mod: 26

## 2020-02-07 RX ORDER — MESALAMINE 400 MG
4 TABLET, DELAYED RELEASE (ENTERIC COATED) ORAL
Qty: 0 | Refills: 0 | DISCHARGE

## 2020-02-07 RX ORDER — CEFTRIAXONE 500 MG/1
1000 INJECTION, POWDER, FOR SOLUTION INTRAMUSCULAR; INTRAVENOUS EVERY 24 HOURS
Refills: 0 | Status: DISCONTINUED | OUTPATIENT
Start: 2020-02-08 | End: 2020-02-10

## 2020-02-07 RX ORDER — GABAPENTIN 400 MG/1
0 CAPSULE ORAL
Qty: 0 | Refills: 0 | DISCHARGE

## 2020-02-07 RX ORDER — TIOTROPIUM BROMIDE 18 UG/1
1 CAPSULE ORAL; RESPIRATORY (INHALATION)
Qty: 0 | Refills: 0 | DISCHARGE

## 2020-02-07 RX ORDER — TIOTROPIUM BROMIDE 18 UG/1
1 CAPSULE ORAL; RESPIRATORY (INHALATION) DAILY
Refills: 0 | Status: DISCONTINUED | OUTPATIENT
Start: 2020-02-07 | End: 2020-02-14

## 2020-02-07 RX ORDER — ATORVASTATIN CALCIUM 80 MG/1
20 TABLET, FILM COATED ORAL AT BEDTIME
Refills: 0 | Status: DISCONTINUED | OUTPATIENT
Start: 2020-02-07 | End: 2020-02-14

## 2020-02-07 RX ORDER — PREGABALIN 225 MG/1
1000 CAPSULE ORAL DAILY
Refills: 0 | Status: DISCONTINUED | OUTPATIENT
Start: 2020-02-07 | End: 2020-02-14

## 2020-02-07 RX ORDER — ALBUTEROL 90 UG/1
2 AEROSOL, METERED ORAL
Qty: 0 | Refills: 0 | DISCHARGE

## 2020-02-07 RX ORDER — SACCHAROMYCES BOULARDII 250 MG
1 POWDER IN PACKET (EA) ORAL
Qty: 0 | Refills: 0 | DISCHARGE

## 2020-02-07 RX ORDER — IPRATROPIUM/ALBUTEROL SULFATE 18-103MCG
3 AEROSOL WITH ADAPTER (GRAM) INHALATION ONCE
Refills: 0 | Status: COMPLETED | OUTPATIENT
Start: 2020-02-07 | End: 2020-02-07

## 2020-02-07 RX ORDER — FINASTERIDE 5 MG/1
5 TABLET, FILM COATED ORAL DAILY
Refills: 0 | Status: DISCONTINUED | OUTPATIENT
Start: 2020-02-07 | End: 2020-02-14

## 2020-02-07 RX ORDER — CHOLECALCIFEROL (VITAMIN D3) 125 MCG
0 CAPSULE ORAL
Qty: 0 | Refills: 0 | DISCHARGE

## 2020-02-07 RX ORDER — PREGABALIN 225 MG/1
0 CAPSULE ORAL
Qty: 0 | Refills: 0 | DISCHARGE

## 2020-02-07 RX ORDER — SODIUM CHLORIDE 9 MG/ML
2400 INJECTION, SOLUTION INTRAVENOUS ONCE
Refills: 0 | Status: COMPLETED | OUTPATIENT
Start: 2020-02-07 | End: 2020-02-07

## 2020-02-07 RX ORDER — BUDESONIDE AND FORMOTEROL FUMARATE DIHYDRATE 160; 4.5 UG/1; UG/1
2 AEROSOL RESPIRATORY (INHALATION)
Refills: 0 | Status: DISCONTINUED | OUTPATIENT
Start: 2020-02-07 | End: 2020-02-14

## 2020-02-07 RX ORDER — AZITHROMYCIN 500 MG/1
500 TABLET, FILM COATED ORAL ONCE
Refills: 0 | Status: COMPLETED | OUTPATIENT
Start: 2020-02-07 | End: 2020-02-07

## 2020-02-07 RX ORDER — TAMSULOSIN HYDROCHLORIDE 0.4 MG/1
0.4 CAPSULE ORAL AT BEDTIME
Refills: 0 | Status: DISCONTINUED | OUTPATIENT
Start: 2020-02-07 | End: 2020-02-14

## 2020-02-07 RX ORDER — AZITHROMYCIN 500 MG/1
500 TABLET, FILM COATED ORAL DAILY
Refills: 0 | Status: DISCONTINUED | OUTPATIENT
Start: 2020-02-08 | End: 2020-02-10

## 2020-02-07 RX ORDER — GUAIFENESIN/DEXTROMETHORPHAN 600MG-30MG
10 TABLET, EXTENDED RELEASE 12 HR ORAL ONCE
Refills: 0 | Status: COMPLETED | OUTPATIENT
Start: 2020-02-07 | End: 2020-02-07

## 2020-02-07 RX ORDER — DEXAMETHASONE 0.5 MG/5ML
10 ELIXIR ORAL ONCE
Refills: 0 | Status: COMPLETED | OUTPATIENT
Start: 2020-02-07 | End: 2020-02-07

## 2020-02-07 RX ORDER — GABAPENTIN 400 MG/1
300 CAPSULE ORAL DAILY
Refills: 0 | Status: DISCONTINUED | OUTPATIENT
Start: 2020-02-07 | End: 2020-02-14

## 2020-02-07 RX ORDER — ALBUTEROL 90 UG/1
2 AEROSOL, METERED ORAL EVERY 6 HOURS
Refills: 0 | Status: DISCONTINUED | OUTPATIENT
Start: 2020-02-07 | End: 2020-02-07

## 2020-02-07 RX ORDER — FLUDROCORTISONE ACETATE 0.1 MG/1
0.1 TABLET ORAL DAILY
Refills: 0 | Status: DISCONTINUED | OUTPATIENT
Start: 2020-02-07 | End: 2020-02-12

## 2020-02-07 RX ORDER — FINASTERIDE 5 MG/1
1 TABLET, FILM COATED ORAL
Qty: 0 | Refills: 0 | DISCHARGE

## 2020-02-07 RX ORDER — MESALAMINE 400 MG
400 TABLET, DELAYED RELEASE (ENTERIC COATED) ORAL THREE TIMES A DAY
Refills: 0 | Status: DISCONTINUED | OUTPATIENT
Start: 2020-02-07 | End: 2020-02-14

## 2020-02-07 RX ORDER — CEFTRIAXONE 500 MG/1
1000 INJECTION, POWDER, FOR SOLUTION INTRAMUSCULAR; INTRAVENOUS ONCE
Refills: 0 | Status: COMPLETED | OUTPATIENT
Start: 2020-02-07 | End: 2020-02-07

## 2020-02-07 RX ORDER — HEPARIN SODIUM 5000 [USP'U]/ML
5000 INJECTION INTRAVENOUS; SUBCUTANEOUS EVERY 12 HOURS
Refills: 0 | Status: DISCONTINUED | OUTPATIENT
Start: 2020-02-07 | End: 2020-02-14

## 2020-02-07 RX ORDER — IPRATROPIUM/ALBUTEROL SULFATE 18-103MCG
3 AEROSOL WITH ADAPTER (GRAM) INHALATION EVERY 6 HOURS
Refills: 0 | Status: DISCONTINUED | OUTPATIENT
Start: 2020-02-07 | End: 2020-02-10

## 2020-02-07 RX ORDER — ATORVASTATIN CALCIUM 80 MG/1
1 TABLET, FILM COATED ORAL
Qty: 0 | Refills: 0 | DISCHARGE

## 2020-02-07 RX ORDER — FLUTICASONE PROPIONATE AND SALMETEROL 50; 250 UG/1; UG/1
0 POWDER ORAL; RESPIRATORY (INHALATION)
Qty: 0 | Refills: 0 | DISCHARGE

## 2020-02-07 RX ORDER — SACCHAROMYCES BOULARDII 250 MG
250 POWDER IN PACKET (EA) ORAL
Refills: 0 | Status: DISCONTINUED | OUTPATIENT
Start: 2020-02-07 | End: 2020-02-14

## 2020-02-07 RX ORDER — CHOLECALCIFEROL (VITAMIN D3) 125 MCG
1000 CAPSULE ORAL DAILY
Refills: 0 | Status: DISCONTINUED | OUTPATIENT
Start: 2020-02-07 | End: 2020-02-14

## 2020-02-07 RX ORDER — TAMSULOSIN HYDROCHLORIDE 0.4 MG/1
1 CAPSULE ORAL
Qty: 0 | Refills: 0 | DISCHARGE

## 2020-02-07 RX ADMIN — Medication 102 MILLIGRAM(S): at 13:47

## 2020-02-07 RX ADMIN — Medication 400 MILLIGRAM(S): at 21:26

## 2020-02-07 RX ADMIN — AZITHROMYCIN 255 MILLIGRAM(S): 500 TABLET, FILM COATED ORAL at 18:33

## 2020-02-07 RX ADMIN — CEFTRIAXONE 100 MILLIGRAM(S): 500 INJECTION, POWDER, FOR SOLUTION INTRAMUSCULAR; INTRAVENOUS at 15:36

## 2020-02-07 RX ADMIN — BUDESONIDE AND FORMOTEROL FUMARATE DIHYDRATE 2 PUFF(S): 160; 4.5 AEROSOL RESPIRATORY (INHALATION) at 21:40

## 2020-02-07 RX ADMIN — HEPARIN SODIUM 5000 UNIT(S): 5000 INJECTION INTRAVENOUS; SUBCUTANEOUS at 18:27

## 2020-02-07 RX ADMIN — Medication 3 MILLILITER(S): at 13:33

## 2020-02-07 RX ADMIN — Medication 250 MILLIGRAM(S): at 18:28

## 2020-02-07 RX ADMIN — Medication 10 MILLIGRAM(S): at 14:17

## 2020-02-07 RX ADMIN — Medication 10 MILLILITER(S): at 13:43

## 2020-02-07 RX ADMIN — GABAPENTIN 300 MILLIGRAM(S): 400 CAPSULE ORAL at 21:26

## 2020-02-07 RX ADMIN — ATORVASTATIN CALCIUM 20 MILLIGRAM(S): 80 TABLET, FILM COATED ORAL at 21:26

## 2020-02-07 RX ADMIN — TAMSULOSIN HYDROCHLORIDE 0.4 MILLIGRAM(S): 0.4 CAPSULE ORAL at 21:26

## 2020-02-07 RX ADMIN — SODIUM CHLORIDE 2400 MILLILITER(S): 9 INJECTION, SOLUTION INTRAVENOUS at 15:41

## 2020-02-07 NOTE — H&P ADULT - NSHPPHYSICALEXAM_GEN_ALL_CORE
T(C): 36.8 (02-07-20 @ 12:41), Max: 36.8 (02-07-20 @ 12:41)  HR: 90 (02-07-20 @ 12:41) (90 - 90)  BP: 128/74 (02-07-20 @ 12:41) (128/74 - 128/74)  RR: 16 (02-07-20 @ 12:41) (16 - 16)  SpO2: 93% (02-07-20 @ 12:41) (93% - 93%)  Wt(kg): --Vital Signs Last 24 Hrs  T(C): 36.8 (07 Feb 2020 12:41), Max: 36.8 (07 Feb 2020 12:41)  T(F): 98.3 (07 Feb 2020 12:41), Max: 98.3 (07 Feb 2020 12:41)  HR: 90 (07 Feb 2020 12:41) (90 - 90)  BP: 128/74 (07 Feb 2020 12:41) (128/74 - 128/74)  BP(mean): --  RR: 16 (07 Feb 2020 12:41) (16 - 16)  SpO2: 93% (07 Feb 2020 12:41) (93% - 93%)    PHYSICAL EXAM:  GENERAL: NAD, well-groomed, well-developed  HEAD:  Atraumatic, Normocephalic  EYES: EOMI, PERRLA, conjunctiva and sclera clear  ENMT: No tonsillar erythema, exudates, or enlargement; Moist mucous membranes, Good dentition, No lesions  NECK: Supple, No JVD, Normal thyroid  NERVOUS SYSTEM:  Alert & Oriented X3, Good concentration; Motor Strength 5/5 B/L upper and lower extremities; DTRs 2+ intact and symmetric  CHEST/LUNG: diminished bilaterally, left basilar crackles   HEART: Regular rate and rhythm; No murmurs, rubs, or gallops  ABDOMEN: Soft, Nontender, Nondistended; Bowel sounds present  EXTREMITIES:  2+ Peripheral Pulses, No clubbing, cyanosis, or edema  LYMPH: No lymphadenopathy noted  SKIN: No rashes or lesions

## 2020-02-07 NOTE — ED ADULT NURSE NOTE - NSIMPLEMENTINTERV_GEN_ALL_ED
Implemented All Universal Safety Interventions:  [place] to call system. Call bell, personal items and telephone within reach. Instruct patient to call for assistance. Room bathroom lighting operational. Non-slip footwear when patient is off stretcher. Physically safe environment: no spills, clutter or unnecessary equipment. Stretcher in lowest position, wheels locked, appropriate side rails in place.

## 2020-02-07 NOTE — ED PROVIDER NOTE - CARE PLAN
Principal Discharge DX:	Pneumonia of lower lobe due to infectious organism, unspecified laterality  Secondary Diagnosis:	Hypoxia

## 2020-02-07 NOTE — ED PROVIDER NOTE - CLINICAL SUMMARY MEDICAL DECISION MAKING FREE TEXT BOX
75 y/o M with h/o COPD pw cough x 1 month, generalized weakness, shortness of breath worsened since yesterday. Monitors O2 sat at home (baseline 93%) desaturated to 83% on RA this am. Does have home O2 but does not use it. Denies fever, chills, nausea, vomiting, chest pain. Patient did receive a flu shot this year. Currently very tight on exam- will obtain basic labs with Trop, EKG, CXR, ABG, administer duo nebs, steroids and reassess 75 y/o M with h/o COPD pw cough x 1 month, generalized weakness, shortness of breath worsened since yesterday. Monitors O2 sat at home (baseline 93%) desaturated to 83% on RA this am. Does have home O2 but does not use it. Denies fever, chills, nausea, vomiting, chest pain. Patient did receive a flu shot this year. Currently very tight on exam- will obtain basic labs with Trop, EKG, CXR, ABG, administer duo nebs, steroids and reassess  [facility] @1440- Did not meet sepsis criteria upon arrival. WBC 16. CXR with LLL infiltrate. Will proceed with further sepsis work up and administer ABX. Admitted to Dr. Garcia. 73 y/o M with h/o COPD pw cough x 1 month, generalized weakness, shortness of breath worsened since yesterday. Monitors O2 sat at home (baseline 93%) desaturated to 83% on RA this am. Does have home O2 but does not use it. Denies fever, chills, nausea, vomiting, chest pain. Patient did receive a flu shot this year. Currently very tight on exam- will obtain basic labs with Trop, EKG, CXR, ABG, administer duo nebs, steroids and reassess  [facility] @1440- Did not meet sepsis criteria upon arrival. Currently now with WBC 16. CXR with LLL infiltrate. Will proceed with further sepsis work up, give sepsis fluids based on ideal body weight, check influenza and administer ABX. Discussed and admitted to Dr. Garcia.

## 2020-02-07 NOTE — ED ADULT NURSE NOTE - OBJECTIVE STATEMENT
Had been sick on antibiotics and antihistamine. No fever. Felt better then bad again 2 days ago. cough with mucous production and some shortness of breath. denies fever. Sputum is tan/brownish in color.

## 2020-02-07 NOTE — ED PROVIDER NOTE - ATTENDING CONTRIBUTION TO CARE
75 y/o M with h/o COPD pw cough x 1 month, generalized weakness, shortness of breath worsened since yesterday. Monitors O2 sat at home (baseline 93%) desaturated to 83% on RA this am. Does have home O2 but does not use it. Denies fever, chills, nausea, vomiting, chest pain. Patient did receive a flu shot this year. Currently very tight on exam- will obtain basic labs with Trop, EKG, CXR, ABG, administer duo nebs, steroids and reassess  Did not meet sepsis criteria upon arrival. Currently now with WBC 16. CXR with LLL infiltrate. Will proceed with further sepsis work up, give sepsis fluids based on ideal body weight, check influenza and administer ABX. Discussed and admitted to Dr. Garcia.  Dr. Palomares:  I have reviewed and discussed with the PA/ resident the case specifics, including the history, physical assessment, evaluation, conclusion, laboratory results, and medical plan. I agree with the contents, and conclusions. I have personally examined, and interviewed the patient.

## 2020-02-07 NOTE — H&P ADULT - HISTORY OF PRESENT ILLNESS
74M PMH COPD on intermittent O2 (night and exertionally) presents for shortness of breath.  Patient states he was getting over a viral illness and felt back to normal on Wednesday but then yesterday felt like a truck hit him.  Patient states he has been having shortness of breath above baseline.  Patient also noted feeling generalized weakness.    Denies chest pain, vomiting, headache.     Notes fever, chills.

## 2020-02-07 NOTE — H&P ADULT - ASSESSMENT
74M PMH COPD presents for shortness of breath found to have severe sepsis secondary to community acquired pneumonia.    Community acquired pneumonia  Acute on chronic respiratory failure, hypoxia  Severe sepsis (hypoxic respiratory failure)  - cont rocephin/azithromycin for cAP coverage  - prn nebulizer    COPD on intermittent O2  - cont singulair  - symbicort  - prn nebulizers     BPH  - tamsulosin    Thrombocytopenia  - secondary to sepsis    DVT  - HSQ    CAPRINI SCORE [CLOT]    AGE RELATED RISK FACTORS                                                       MOBILITY RELATED FACTORS  [ ] Age 41-60 years                                            (1 Point)                  [ ] Bed rest                                                        (1 Point)  [ ] Age: 61-74 years                                           (2 Points)                 [ ] Plaster cast                                                   (2 Points)  [X ] Age= 75 years                                              (3 Points)                 [ ] Bed bound for more than 72 hours                 (2 Points)    DISEASE RELATED RISK FACTORS                                               GENDER SPECIFIC FACTORS  [ ] Edema in the lower extremities                       (1 Point)                  [ ] Pregnancy                                                     (1 Point)  [ ] Varicose veins                                               (1 Point)                  [ ] Post-partum < 6 weeks                                   (1 Point)             [ ] BMI > 25 Kg/m2                                            (1 Point)                  [ ] Hormonal therapy  or oral contraception          (1 Point)                 [ ] Sepsis (in the previous month)                        (1 Point)                  [ ] History of pregnancy complications                 (1 point)  [X ] Pneumonia or serious lung disease                                               [ ] Unexplained or recurrent                     (1 Point)           (in the previous month)                               (1 Point)  [ ] Abnormal pulmonary function test                     (1 Point)                 SURGERY RELATED RISK FACTORS  [ ] Acute myocardial infarction                              (1 Point)                 [ ]  Section                                             (1 Point)  [ ] Congestive heart failure (in the previous month)  (1 Point)               [ ] Minor surgery                                                  (1 Point)   [ ] Inflammatory bowel disease                             (1 Point)                 [ ] Arthroscopic surgery                                        (2 Points)  [ ] Central venous access                                      (2 Points)                [ ] General surgery lasting more than 45 minutes   (2 Points)       [ ] Stroke (in the previous month)                          (5 Points)               [ ] Elective arthroplasty                                         (5 Points)                                                                                                                                               HEMATOLOGY RELATED FACTORS                                                 TRAUMA RELATED RISK FACTORS  [ ] Prior episodes of VTE                                     (3 Points)                [ ] Fracture of the hip, pelvis, or leg                       (5 Points)  [ ] Positive family history for VTE                         (3 Points)                 [ ] Acute spinal cord injury (in the previous month)  (5 Points)  [ ] Prothrombin 48151 A                                     (3 Points)                 [ ] Paralysis  (less than 1 month)                             (5 Points)  [ ] Factor V Leiden                                             (3 Points)                  [ ] Multiple Trauma within 1 month                        (5 Points)  [ ] Lupus anticoagulants                                     (3 Points)                                                           [ ] Anticardiolipin antibodies                               (3 Points)                                                       [ ] High homocysteine in the blood                      (3 Points)                                             [ ] Other congenital or acquired thrombophilia      (3 Points)                                                [ ] Heparin induced thrombocytopenia                  (3 Points)                                          Total Score [    4      ]    Caprini Score 0 - 2:  Low Risk, No VTE Prophylaxis required for most patients, encourage ambulation  Caprini Score 3 - 6:  At Risk, pharmacologic VTE prophylaxis is indicated for most patients (in the absence of a contraindication)  Caprini Score Greater than or = 7:  High Risk, pharmacologic VTE prophylaxis is indicated for most patients (in the absence of a contraindication)

## 2020-02-07 NOTE — H&P ADULT - NSHPLABSRESULTS_GEN_ALL_CORE
LABS:                        13.9   16.24 )-----------( 133      ( 07 Feb 2020 13:12 )             40.6     02-07    144  |  106  |  17  ----------------------------<  147<H>  3.4<L>   |  28  |  1.16    Ca    8.8      07 Feb 2020 13:12    TPro  6.6  /  Alb  3.0<L>  /  TBili  1.4<H>  /  DBili  x   /  AST  9<L>  /  ALT  16  /  AlkPhos  76  02-07    CAPILLARY BLOOD GLUCOSE    ABG - ( 07 Feb 2020 13:52 )  pH, Arterial: 7.45  pH, Blood: x     /  pCO2: 36    /  pO2: 69    / HCO3: x     / Base Excess: x     /  SaO2: 94        RADIOLOGY & ADDITIONAL TESTS:    Consultant(s) Notes Reviewed:  [x ] YES  [ ] NO  Care Discussed with Consultants/Other Providers [ x] YES  [ ] NO  Imaging Personally Reviewed:  [X ] YES  [ ] NO

## 2020-02-07 NOTE — ED PROVIDER NOTE - OBJECTIVE STATEMENT
75 y/o M with h/o COPD pw cough x 1 month, generalized weakness, shortness of breath worsened since yesterday. Monitors O2 sat at home (baseline 93%) desaturated to 83% on RA this am. Does have home O2 but does not use it. Denies fever, chills, nausea, vomiting, chest pain. patient did receive a flu shot this year.   PMD- Fina  Pulm- Aminata  (+) former smoker

## 2020-02-08 LAB
ALBUMIN SERPL ELPH-MCNC: 2.6 G/DL — LOW (ref 3.3–5)
ALP SERPL-CCNC: 67 U/L — SIGNIFICANT CHANGE UP (ref 40–120)
ALT FLD-CCNC: 13 U/L — SIGNIFICANT CHANGE UP (ref 10–45)
ANION GAP SERPL CALC-SCNC: 8 MMOL/L — SIGNIFICANT CHANGE UP (ref 5–17)
AST SERPL-CCNC: 10 U/L — SIGNIFICANT CHANGE UP (ref 10–40)
BASOPHILS # BLD AUTO: 0.04 K/UL — SIGNIFICANT CHANGE UP (ref 0–0.2)
BASOPHILS NFR BLD AUTO: 0.2 % — SIGNIFICANT CHANGE UP (ref 0–2)
BILIRUB SERPL-MCNC: 0.5 MG/DL — SIGNIFICANT CHANGE UP (ref 0.2–1.2)
BUN SERPL-MCNC: 20 MG/DL — SIGNIFICANT CHANGE UP (ref 7–23)
CALCIUM SERPL-MCNC: 9 MG/DL — SIGNIFICANT CHANGE UP (ref 8.4–10.5)
CHLORIDE SERPL-SCNC: 107 MMOL/L — SIGNIFICANT CHANGE UP (ref 96–108)
CO2 SERPL-SCNC: 29 MMOL/L — SIGNIFICANT CHANGE UP (ref 22–31)
CREAT SERPL-MCNC: 1.1 MG/DL — SIGNIFICANT CHANGE UP (ref 0.5–1.3)
EOSINOPHIL # BLD AUTO: 0.12 K/UL — SIGNIFICANT CHANGE UP (ref 0–0.5)
EOSINOPHIL NFR BLD AUTO: 0.7 % — SIGNIFICANT CHANGE UP (ref 0–6)
GLUCOSE SERPL-MCNC: 159 MG/DL — HIGH (ref 70–99)
GRAM STN FLD: SIGNIFICANT CHANGE UP
HCT VFR BLD CALC: 38.3 % — LOW (ref 39–50)
HGB BLD-MCNC: 12.9 G/DL — LOW (ref 13–17)
IMM GRANULOCYTES NFR BLD AUTO: 0.5 % — SIGNIFICANT CHANGE UP (ref 0–1.5)
LYMPHOCYTES # BLD AUTO: 0.77 K/UL — LOW (ref 1–3.3)
LYMPHOCYTES # BLD AUTO: 4.5 % — LOW (ref 13–44)
MCHC RBC-ENTMCNC: 31.7 PG — SIGNIFICANT CHANGE UP (ref 27–34)
MCHC RBC-ENTMCNC: 33.7 GM/DL — SIGNIFICANT CHANGE UP (ref 32–36)
MCV RBC AUTO: 94.1 FL — SIGNIFICANT CHANGE UP (ref 80–100)
MONOCYTES # BLD AUTO: 0.74 K/UL — SIGNIFICANT CHANGE UP (ref 0–0.9)
MONOCYTES NFR BLD AUTO: 4.3 % — SIGNIFICANT CHANGE UP (ref 2–14)
NEUTROPHILS # BLD AUTO: 15.31 K/UL — HIGH (ref 1.8–7.4)
NEUTROPHILS NFR BLD AUTO: 89.8 % — HIGH (ref 43–77)
NRBC # BLD: 0 /100 WBCS — SIGNIFICANT CHANGE UP (ref 0–0)
PLATELET # BLD AUTO: 135 K/UL — LOW (ref 150–400)
POTASSIUM SERPL-MCNC: 3.7 MMOL/L — SIGNIFICANT CHANGE UP (ref 3.5–5.3)
POTASSIUM SERPL-SCNC: 3.7 MMOL/L — SIGNIFICANT CHANGE UP (ref 3.5–5.3)
PROT SERPL-MCNC: 6.3 G/DL — SIGNIFICANT CHANGE UP (ref 6–8.3)
RAPID RVP RESULT: SIGNIFICANT CHANGE UP
RBC # BLD: 4.07 M/UL — LOW (ref 4.2–5.8)
RBC # FLD: 13.2 % — SIGNIFICANT CHANGE UP (ref 10.3–14.5)
SODIUM SERPL-SCNC: 144 MMOL/L — SIGNIFICANT CHANGE UP (ref 135–145)
SPECIMEN SOURCE: SIGNIFICANT CHANGE UP
WBC # BLD: 17.07 K/UL — HIGH (ref 3.8–10.5)
WBC # FLD AUTO: 17.07 K/UL — HIGH (ref 3.8–10.5)

## 2020-02-08 PROCEDURE — 99232 SBSQ HOSP IP/OBS MODERATE 35: CPT

## 2020-02-08 RX ADMIN — Medication 250 MILLIGRAM(S): at 05:52

## 2020-02-08 RX ADMIN — Medication 250 MILLIGRAM(S): at 17:51

## 2020-02-08 RX ADMIN — HEPARIN SODIUM 5000 UNIT(S): 5000 INJECTION INTRAVENOUS; SUBCUTANEOUS at 05:52

## 2020-02-08 RX ADMIN — HEPARIN SODIUM 5000 UNIT(S): 5000 INJECTION INTRAVENOUS; SUBCUTANEOUS at 17:51

## 2020-02-08 RX ADMIN — CEFTRIAXONE 100 MILLIGRAM(S): 500 INJECTION, POWDER, FOR SOLUTION INTRAMUSCULAR; INTRAVENOUS at 05:52

## 2020-02-08 RX ADMIN — TIOTROPIUM BROMIDE 1 CAPSULE(S): 18 CAPSULE ORAL; RESPIRATORY (INHALATION) at 09:19

## 2020-02-08 RX ADMIN — FINASTERIDE 5 MILLIGRAM(S): 5 TABLET, FILM COATED ORAL at 11:55

## 2020-02-08 RX ADMIN — ATORVASTATIN CALCIUM 20 MILLIGRAM(S): 80 TABLET, FILM COATED ORAL at 21:47

## 2020-02-08 RX ADMIN — Medication 400 MILLIGRAM(S): at 21:47

## 2020-02-08 RX ADMIN — TAMSULOSIN HYDROCHLORIDE 0.4 MILLIGRAM(S): 0.4 CAPSULE ORAL at 21:47

## 2020-02-08 RX ADMIN — FLUDROCORTISONE ACETATE 0.1 MILLIGRAM(S): 0.1 TABLET ORAL at 05:52

## 2020-02-08 RX ADMIN — BUDESONIDE AND FORMOTEROL FUMARATE DIHYDRATE 2 PUFF(S): 160; 4.5 AEROSOL RESPIRATORY (INHALATION) at 20:37

## 2020-02-08 RX ADMIN — PREGABALIN 1000 MICROGRAM(S): 225 CAPSULE ORAL at 11:56

## 2020-02-08 RX ADMIN — AZITHROMYCIN 500 MILLIGRAM(S): 500 TABLET, FILM COATED ORAL at 11:55

## 2020-02-08 RX ADMIN — BUDESONIDE AND FORMOTEROL FUMARATE DIHYDRATE 2 PUFF(S): 160; 4.5 AEROSOL RESPIRATORY (INHALATION) at 09:20

## 2020-02-08 RX ADMIN — GABAPENTIN 300 MILLIGRAM(S): 400 CAPSULE ORAL at 21:47

## 2020-02-08 RX ADMIN — Medication 400 MILLIGRAM(S): at 05:52

## 2020-02-08 RX ADMIN — Medication 1000 UNIT(S): at 11:55

## 2020-02-08 NOTE — PROGRESS NOTE ADULT - ASSESSMENT
A/P: 75yo male w. PMH COPD presents w. c/o SOB, generalized weakness found to have severe sepsis secondary to community acquired pneumonia.    *Severe Sepsis, Leukocytosis in s/o LLL CAP  CXR: LLL Infiltrate   Cont Zithromax/Ceftriaxone  Flu A/B/RSV Neg, f/u full RVP  f/u S. Pne & Legionella  f/u Resp. Cx   Lactate 1.1  WBC 17 today, trend  f/u BldCx  f/u UCx (UA Neg)     *COPD, Acute on Chronic respiratory failure, Hypoxia  Supplemental 02 as needed, Wean as tolerated  Cont Singulair, Symbicort, Nebs    *Thrombocytopenia  secondary to sepsis  Trend Pls    *BPH  Cont Finasteride     *DVT ppx  UFH/OOB ad stefania      Dispo: as above.

## 2020-02-08 NOTE — PROGRESS NOTE ADULT - SUBJECTIVE AND OBJECTIVE BOX
Patient is a 74y old  Male who presents with a chief complaint of sob/weakness/fever (2020 15:37)    Patient seen and examined at bedside.  S: Reports feeling well this morning. Denies SOB at rest in bed. Eating breakfast. No f/c overnight.     ALLERGIES:  gold containing compounds (Unknown)  Onions (Unknown)    MEDICATIONS:  albuterol/ipratropium for Nebulization. 3 milliLiter(s) Nebulizer every 6 hours PRN  atorvastatin 20 milliGRAM(s) Oral at bedtime  azithromycin   Tablet 500 milliGRAM(s) Oral daily  budesonide 160 MICROgram(s)/formoterol 4.5 MICROgram(s) Inhaler 2 Puff(s) Inhalation two times a day  cefTRIAXone   IVPB 1000 milliGRAM(s) IV Intermittent every 24 hours  cholecalciferol 1000 Unit(s) Oral daily  cyanocobalamin 1000 MICROGram(s) Oral daily  finasteride 5 milliGRAM(s) Oral daily  fludroCORTISONE 0.1 milliGRAM(s) Oral daily  gabapentin 300 milliGRAM(s) Oral daily  heparin  Injectable 5000 Unit(s) SubCutaneous every 12 hours  mesalamine DR Capsule 400 milliGRAM(s) Oral three times a day  saccharomyces boulardii 250 milliGRAM(s) Oral two times a day  tamsulosin 0.4 milliGRAM(s) Oral at bedtime  tiotropium 18 MICROgram(s) Capsule 1 Capsule(s) Inhalation daily    Vital Signs Last 24 Hrs  T(F): 97.4 (2020 05:45), Max: 98.6 (2020 21:07)  HR: 80 (2020 05:45) (80 - 93)  BP: 154/78 (2020 05:45) (128/74 - 165/78)  RR: 16 (2020 05:45) (16 - 18)  SpO2: 96% (2020 05:45) (93% - 96%)  I&O's Summary    2020 07:01  -  2020 07:00  --------------------------------------------------------  IN: 340 mL / OUT: 0 mL / NET: 340 mL    2020 07:01  -  2020 10:50  --------------------------------------------------------  IN: 700 mL / OUT: 0 mL / NET: 700 mL      PHYSICAL EXAM:  General: NAD, A/O x 3  ENT: MMM  Lungs: Clear to auscultation bilaterally  Cardio: RR, S1/S2, No murmurs  Abdomen: Soft, NT/ND, Normal active Bowel Sounds   Extremities: No cyanosis, No edema    LABS:                        12.9   17.07 )-----------( 135      ( 2020 06:55 )             38.3         144  |  107  |  20  ----------------------------<  159  3.7   |  29  |  1.10    Ca    9.0      2020 06:55    TPro  6.3  /  Alb  2.6  /  TBili  0.5  /  DBili  x   /  AST  10  /  ALT  13  /  AlkPhos  67  02-08    eGFR if Non African American: 65 mL/min/1.73M2 (20 @ 06:55)  eGFR if African American: 76 mL/min/1.73M2 (20 @ 06:55)      Lactate, Blood: 1.1 mmol/L ( @ 15:15)    CARDIAC MARKERS ( 2020 13:12 )  <.017 ng/mL / x     / x     / x     / x            ABG - ( 2020 13:52 )  pH, Arterial: 7.45  pH, Blood: x     /  pCO2: 36    /  pO2: 69    / HCO3: x     / Base Excess: x     /  SaO2: 94            Urinalysis Basic - ( 2020 22:20 )    Color: Yellow / Appearance: Clear / S.020 / pH: x  Gluc: x / Ketone: Small  / Bili: Negative / Urobili: Negative   Blood: x / Protein: 15 / Nitrite: Negative   Leuk Esterase: Negative / RBC: Negative /HPF / WBC 0-2 /HPF   Sq Epi: x / Non Sq Epi: Neg.-Few / Bacteria: Negative /HPF        RADIOLOGY & ADDITIONAL TESTS:  < from: Xray Chest 1 View AP/PA (20 @ 14:12) >  IMPRESSION: Left lung infiltrate.      Care Discussed with Consultants/Other Providers: Dr. Saenz.

## 2020-02-09 LAB
ANION GAP SERPL CALC-SCNC: 7 MMOL/L — SIGNIFICANT CHANGE UP (ref 5–17)
BUN SERPL-MCNC: 19 MG/DL — SIGNIFICANT CHANGE UP (ref 7–23)
CALCIUM SERPL-MCNC: 8.5 MG/DL — SIGNIFICANT CHANGE UP (ref 8.4–10.5)
CHLORIDE SERPL-SCNC: 109 MMOL/L — HIGH (ref 96–108)
CO2 SERPL-SCNC: 31 MMOL/L — SIGNIFICANT CHANGE UP (ref 22–31)
CREAT SERPL-MCNC: 0.96 MG/DL — SIGNIFICANT CHANGE UP (ref 0.5–1.3)
CULTURE RESULTS: SIGNIFICANT CHANGE UP
GLUCOSE SERPL-MCNC: 109 MG/DL — HIGH (ref 70–99)
HCT VFR BLD CALC: 36 % — LOW (ref 39–50)
HGB BLD-MCNC: 11.6 G/DL — LOW (ref 13–17)
LEGIONELLA AG UR QL: NEGATIVE — SIGNIFICANT CHANGE UP
MCHC RBC-ENTMCNC: 30.4 PG — SIGNIFICANT CHANGE UP (ref 27–34)
MCHC RBC-ENTMCNC: 32.2 GM/DL — SIGNIFICANT CHANGE UP (ref 32–36)
MCV RBC AUTO: 94.2 FL — SIGNIFICANT CHANGE UP (ref 80–100)
NRBC # BLD: 0 /100 WBCS — SIGNIFICANT CHANGE UP (ref 0–0)
PLATELET # BLD AUTO: 135 K/UL — LOW (ref 150–400)
POTASSIUM SERPL-MCNC: 3.2 MMOL/L — LOW (ref 3.5–5.3)
POTASSIUM SERPL-SCNC: 3.2 MMOL/L — LOW (ref 3.5–5.3)
RBC # BLD: 3.82 M/UL — LOW (ref 4.2–5.8)
RBC # FLD: 13.6 % — SIGNIFICANT CHANGE UP (ref 10.3–14.5)
SODIUM SERPL-SCNC: 147 MMOL/L — HIGH (ref 135–145)
SPECIMEN SOURCE: SIGNIFICANT CHANGE UP
TROPONIN I SERPL-MCNC: <.017 NG/ML — LOW (ref 0.02–0.06)
TROPONIN I SERPL-MCNC: <.017 NG/ML — LOW (ref 0.02–0.06)
WBC # BLD: 13.38 K/UL — HIGH (ref 3.8–10.5)
WBC # FLD AUTO: 13.38 K/UL — HIGH (ref 3.8–10.5)

## 2020-02-09 PROCEDURE — 71045 X-RAY EXAM CHEST 1 VIEW: CPT | Mod: 26

## 2020-02-09 PROCEDURE — 99232 SBSQ HOSP IP/OBS MODERATE 35: CPT

## 2020-02-09 PROCEDURE — 93010 ELECTROCARDIOGRAM REPORT: CPT

## 2020-02-09 RX ORDER — ACETAMINOPHEN 500 MG
650 TABLET ORAL EVERY 6 HOURS
Refills: 0 | Status: DISCONTINUED | OUTPATIENT
Start: 2020-02-09 | End: 2020-02-14

## 2020-02-09 RX ORDER — POTASSIUM CHLORIDE 20 MEQ
20 PACKET (EA) ORAL
Refills: 0 | Status: COMPLETED | OUTPATIENT
Start: 2020-02-09 | End: 2020-02-09

## 2020-02-09 RX ADMIN — HEPARIN SODIUM 5000 UNIT(S): 5000 INJECTION INTRAVENOUS; SUBCUTANEOUS at 05:35

## 2020-02-09 RX ADMIN — GABAPENTIN 300 MILLIGRAM(S): 400 CAPSULE ORAL at 21:24

## 2020-02-09 RX ADMIN — Medication 400 MILLIGRAM(S): at 05:35

## 2020-02-09 RX ADMIN — BUDESONIDE AND FORMOTEROL FUMARATE DIHYDRATE 2 PUFF(S): 160; 4.5 AEROSOL RESPIRATORY (INHALATION) at 10:04

## 2020-02-09 RX ADMIN — ATORVASTATIN CALCIUM 20 MILLIGRAM(S): 80 TABLET, FILM COATED ORAL at 21:24

## 2020-02-09 RX ADMIN — FINASTERIDE 5 MILLIGRAM(S): 5 TABLET, FILM COATED ORAL at 11:13

## 2020-02-09 RX ADMIN — CEFTRIAXONE 100 MILLIGRAM(S): 500 INJECTION, POWDER, FOR SOLUTION INTRAMUSCULAR; INTRAVENOUS at 05:33

## 2020-02-09 RX ADMIN — TAMSULOSIN HYDROCHLORIDE 0.4 MILLIGRAM(S): 0.4 CAPSULE ORAL at 21:24

## 2020-02-09 RX ADMIN — Medication 20 MILLIEQUIVALENT(S): at 09:58

## 2020-02-09 RX ADMIN — Medication 400 MILLIGRAM(S): at 21:24

## 2020-02-09 RX ADMIN — BUDESONIDE AND FORMOTEROL FUMARATE DIHYDRATE 2 PUFF(S): 160; 4.5 AEROSOL RESPIRATORY (INHALATION) at 21:58

## 2020-02-09 RX ADMIN — HEPARIN SODIUM 5000 UNIT(S): 5000 INJECTION INTRAVENOUS; SUBCUTANEOUS at 17:33

## 2020-02-09 RX ADMIN — Medication 400 MILLIGRAM(S): at 13:24

## 2020-02-09 RX ADMIN — Medication 250 MILLIGRAM(S): at 17:33

## 2020-02-09 RX ADMIN — Medication 20 MILLIEQUIVALENT(S): at 11:13

## 2020-02-09 RX ADMIN — PREGABALIN 1000 MICROGRAM(S): 225 CAPSULE ORAL at 11:13

## 2020-02-09 RX ADMIN — FLUDROCORTISONE ACETATE 0.1 MILLIGRAM(S): 0.1 TABLET ORAL at 05:35

## 2020-02-09 RX ADMIN — Medication 250 MILLIGRAM(S): at 05:35

## 2020-02-09 RX ADMIN — AZITHROMYCIN 500 MILLIGRAM(S): 500 TABLET, FILM COATED ORAL at 11:13

## 2020-02-09 RX ADMIN — TIOTROPIUM BROMIDE 1 CAPSULE(S): 18 CAPSULE ORAL; RESPIRATORY (INHALATION) at 10:03

## 2020-02-09 RX ADMIN — Medication 20 MILLIEQUIVALENT(S): at 08:48

## 2020-02-09 RX ADMIN — Medication 650 MILLIGRAM(S): at 16:26

## 2020-02-09 RX ADMIN — Medication 650 MILLIGRAM(S): at 00:00

## 2020-02-09 RX ADMIN — Medication 1000 UNIT(S): at 11:13

## 2020-02-09 NOTE — PROVIDER CONTACT NOTE (CHANGE IN STATUS NOTIFICATION) - SITUATION
pt c/o chest pain spitting up scant amount of bloody sputum.  v/s stable did ekg, trop negitive and xray.  will continue to monitor pt.  pt stated "I have this pain all the time on and off."

## 2020-02-09 NOTE — PROGRESS NOTE ADULT - ASSESSMENT
75yo male w. PMH COPD presents w. c/o SOB, generalized weakness found to have severe sepsis secondary to community acquired pneumonia.    #Left Lower lobe Pneumonia  Cont Zithromax/Ceftriaxone  viral panel negative for Flu A/B/RSV   Pt reported some blood streaks while coughing today, will continue to monitor as some blood streaks is expected due to underlying inflammation/pneumonia . If pt continues to have hemoptysis consider pulmonary eval . Also reported left sided chest pain which could also be due to infiltrate. stat trops negative, ecg :nonspecific st changes , unchanged from prior ekg. follow up serial ecg  Pain management  blood cx : neg, sputum cx: rare gram variable rods    #COPD: Stable  Supplemental 02 as needed, Wean as tolerated  Cont Singulair, Symbicort, Nebs    #Thrombocytopenia  Likely due to sepsis  monitor plts    #BPH  Cont Finasteride     #DVT ppx: S/c heparin 75yo male w. PMH COPD presents w. c/o SOB, generalized weakness found to have severe sepsis secondary to community acquired pneumonia.    #Left Lower lobe Pneumonia  Cont Zithromax/Ceftriaxone  viral panel negative for Flu A/B/RSV   Pt reported some blood streaks while coughing today, will continue to monitor as some blood streaks is expected due to underlying inflammation/pneumonia . If pt continues to have hemoptysis consider pulmonary eval . Also reported left sided chest pain which could also be due to infiltrate. stat trops negative, ecg :nonspecific st changes , unchanged from prior ekg. follow up serial ecg  Pain management  blood cx : neg, sputum cx: rare gram variable rods    #COPD: Stable  Supplemental 02 as needed, Wean as tolerated  Cont Singulair, Symbicort, Nebs    #Hypernatremia   encourage PO free water for 24 hours  monitor Na in AM    #HYpokalemia  repleted . repeat BMP AM    #Thrombocytopenia  Likely due to sepsis  monitor plts    #BPH  Cont Finasteride     #DVT ppx: S/c heparin

## 2020-02-09 NOTE — PROGRESS NOTE ADULT - SUBJECTIVE AND OBJECTIVE BOX
Patient is a 74y old  Male who presents with a chief complaint of Pneumonia (2020 10:49)    Interval Hx  Patient seen and examined at bedside. No overnight events reported. Reported left sided chest pain this noon, and some blood streaks while coughing .     ALLERGIES:  gold containing compounds (Unknown)  Onions (Unknown)    MEDICATIONS  (STANDING):  atorvastatin 20 milliGRAM(s) Oral at bedtime  azithromycin   Tablet 500 milliGRAM(s) Oral daily  budesonide 160 MICROgram(s)/formoterol 4.5 MICROgram(s) Inhaler 2 Puff(s) Inhalation two times a day  cefTRIAXone   IVPB 1000 milliGRAM(s) IV Intermittent every 24 hours  cholecalciferol 1000 Unit(s) Oral daily  cyanocobalamin 1000 MICROGram(s) Oral daily  finasteride 5 milliGRAM(s) Oral daily  fludroCORTISONE 0.1 milliGRAM(s) Oral daily  gabapentin 300 milliGRAM(s) Oral daily  heparin  Injectable 5000 Unit(s) SubCutaneous every 12 hours  mesalamine DR Capsule 400 milliGRAM(s) Oral three times a day  saccharomyces boulardii 250 milliGRAM(s) Oral two times a day  tamsulosin 0.4 milliGRAM(s) Oral at bedtime  tiotropium 18 MICROgram(s) Capsule 1 Capsule(s) Inhalation daily    MEDICATIONS  (PRN):  albuterol/ipratropium for Nebulization. 3 milliLiter(s) Nebulizer every 6 hours PRN Shortness of Breath and/or Wheezing    Vital Signs Last 24 Hrs  T(F): 98 (2020 05:17), Max: 98 (2020 05:17)  HR: 66 (2020 10:04) (61 - 77)  BP: 142/74 (2020 05:17) (127/64 - 146/72)  RR: 15 (2020 05:17) (14 - 15)  SpO2: 93% (2020 10:04) (93% - 98%)  I&O's Summary    2020 07:01  -  2020 07:00  --------------------------------------------------------  IN: 750 mL / OUT: 0 mL / NET: 750 mL    2020 07:01  -  2020 15:32  --------------------------------------------------------  IN: 1300 mL / OUT: 0 mL / NET: 1300 mL      PHYSICAL EXAM:  General: NAD, A/O x 3  ENT: MMM, no thrush  Neck: Supple, No JVD  Lungs: Clear to auscultation bilaterally, good air entry, non-labored breathing  Cardio: RRR, S1/S2, No murmur  Abdomen: Soft, Nontender, Nondistended; Bowel sounds present  Extremities: No calf tenderness, No pitting edema    LABS:                        11.6   13.38 )-----------( 135      ( 2020 06:43 )             36.0         147  |  109  |  19  ----------------------------<  109  3.2   |  31  |  0.96    Ca    8.5      2020 06:43    TPro  6.3  /  Alb  2.6  /  TBili  0.5  /  DBili  x   /  AST  10  /  ALT  13  /  AlkPhos  67  -08        eGFR if Non African American: 76 mL/min/1.73M2 (20 @ 06:43)  eGFR if : 89 mL/min/1.73M2 (20 @ 06:43)      Lactate, Blood: 1.1 mmol/L ( @ 15:15)      CARDIAC MARKERS ( 2020 14:25 )  <.017 ng/mL / x     / x     / x     / x      CARDIAC MARKERS ( 2020 13:12 )  <.017 ng/mL / x     / x     / x     / x                ABG - ( 2020 13:52 )  pH, Arterial: 7.45  pH, Blood: x     /  pCO2: 36    /  pO2: 69    / HCO3: x     / Base Excess: x     /  SaO2: 94                          Urinalysis Basic - ( 2020 22:20 )    Color: Yellow / Appearance: Clear / S.020 / pH: x  Gluc: x / Ketone: Small  / Bili: Negative / Urobili: Negative   Blood: x / Protein: 15 / Nitrite: Negative   Leuk Esterase: Negative / RBC: Negative /HPF / WBC 0-2 /HPF   Sq Epi: x / Non Sq Epi: Neg.-Few / Bacteria: Negative /HPF        Culture - Sputum (collected 2020 13:15)  Source: .Sputum Sputum  Gram Stain (2020 22:46):    Rare polymorphonuclear leukocytes per low power field    No Squamous epithelial cells per low power field    Rare Gram Variable Rods per oil power field    Culture - Urine (collected 2020 22:20)  Source: .Urine Clean Catch (Midstream)  Final Report (2020 10:26):    <10,000 CFU/mL Normal Urogenital Nia    Culture - Blood (collected 2020 15:15)  Source: .Blood Blood-Peripheral  Preliminary Report (2020 22:01):    No growth to date.    Culture - Blood (collected 2020 15:15)  Source: .Blood Blood-Peripheral  Preliminary Report (2020 22:01):    No growth to date.      RADIOLOGY & ADDITIONAL TESTS:    < from: Xray Chest 1 View- PORTABLE-Urgent (20 @ 14:44) >  Opacity left lung base slightly improving. No pleural effusion    Heart size within normal limits.      < end of copied text >      Care Discussed with Consultants/Other Providers:

## 2020-02-10 DIAGNOSIS — D69.6 THROMBOCYTOPENIA, UNSPECIFIED: ICD-10-CM

## 2020-02-10 DIAGNOSIS — J18.9 PNEUMONIA, UNSPECIFIED ORGANISM: ICD-10-CM

## 2020-02-10 DIAGNOSIS — R04.2 HEMOPTYSIS: ICD-10-CM

## 2020-02-10 DIAGNOSIS — E78.00 PURE HYPERCHOLESTEROLEMIA, UNSPECIFIED: ICD-10-CM

## 2020-02-10 DIAGNOSIS — J44.9 CHRONIC OBSTRUCTIVE PULMONARY DISEASE, UNSPECIFIED: ICD-10-CM

## 2020-02-10 LAB
-  AMIKACIN: SIGNIFICANT CHANGE UP
-  AZTREONAM: SIGNIFICANT CHANGE UP
-  CEFEPIME: SIGNIFICANT CHANGE UP
-  CEFTAZIDIME: SIGNIFICANT CHANGE UP
-  CIPROFLOXACIN: SIGNIFICANT CHANGE UP
-  GENTAMICIN: SIGNIFICANT CHANGE UP
-  IMIPENEM: SIGNIFICANT CHANGE UP
-  LEVOFLOXACIN: SIGNIFICANT CHANGE UP
-  MEROPENEM: SIGNIFICANT CHANGE UP
-  PIPERACILLIN/TAZOBACTAM: SIGNIFICANT CHANGE UP
-  TOBRAMYCIN: SIGNIFICANT CHANGE UP
ANION GAP SERPL CALC-SCNC: 7 MMOL/L — SIGNIFICANT CHANGE UP (ref 5–17)
BUN SERPL-MCNC: 16 MG/DL — SIGNIFICANT CHANGE UP (ref 7–23)
CALCIUM SERPL-MCNC: 8.3 MG/DL — LOW (ref 8.4–10.5)
CHLORIDE SERPL-SCNC: 107 MMOL/L — SIGNIFICANT CHANGE UP (ref 96–108)
CO2 SERPL-SCNC: 31 MMOL/L — SIGNIFICANT CHANGE UP (ref 22–31)
CREAT SERPL-MCNC: 0.88 MG/DL — SIGNIFICANT CHANGE UP (ref 0.5–1.3)
CULTURE RESULTS: SIGNIFICANT CHANGE UP
GLUCOSE SERPL-MCNC: 88 MG/DL — SIGNIFICANT CHANGE UP (ref 70–99)
HCT VFR BLD CALC: 37.1 % — LOW (ref 39–50)
HGB BLD-MCNC: 12.2 G/DL — LOW (ref 13–17)
MCHC RBC-ENTMCNC: 31.4 PG — SIGNIFICANT CHANGE UP (ref 27–34)
MCHC RBC-ENTMCNC: 32.9 GM/DL — SIGNIFICANT CHANGE UP (ref 32–36)
MCV RBC AUTO: 95.4 FL — SIGNIFICANT CHANGE UP (ref 80–100)
METHOD TYPE: SIGNIFICANT CHANGE UP
NRBC # BLD: 0 /100 WBCS — SIGNIFICANT CHANGE UP (ref 0–0)
ORGANISM # SPEC MICROSCOPIC CNT: SIGNIFICANT CHANGE UP
ORGANISM # SPEC MICROSCOPIC CNT: SIGNIFICANT CHANGE UP
PLATELET # BLD AUTO: 143 K/UL — LOW (ref 150–400)
POTASSIUM SERPL-MCNC: 3.9 MMOL/L — SIGNIFICANT CHANGE UP (ref 3.5–5.3)
POTASSIUM SERPL-SCNC: 3.9 MMOL/L — SIGNIFICANT CHANGE UP (ref 3.5–5.3)
RBC # BLD: 3.89 M/UL — LOW (ref 4.2–5.8)
RBC # FLD: 13.5 % — SIGNIFICANT CHANGE UP (ref 10.3–14.5)
S PNEUM AG UR QL: NEGATIVE — SIGNIFICANT CHANGE UP
SODIUM SERPL-SCNC: 145 MMOL/L — SIGNIFICANT CHANGE UP (ref 135–145)
SPECIMEN SOURCE: SIGNIFICANT CHANGE UP
WBC # BLD: 8.29 K/UL — SIGNIFICANT CHANGE UP (ref 3.8–10.5)
WBC # FLD AUTO: 8.29 K/UL — SIGNIFICANT CHANGE UP (ref 3.8–10.5)

## 2020-02-10 PROCEDURE — 99223 1ST HOSP IP/OBS HIGH 75: CPT

## 2020-02-10 PROCEDURE — 99232 SBSQ HOSP IP/OBS MODERATE 35: CPT

## 2020-02-10 PROCEDURE — 71275 CT ANGIOGRAPHY CHEST: CPT | Mod: 26

## 2020-02-10 RX ORDER — IPRATROPIUM/ALBUTEROL SULFATE 18-103MCG
3 AEROSOL WITH ADAPTER (GRAM) INHALATION EVERY 6 HOURS
Refills: 0 | Status: DISCONTINUED | OUTPATIENT
Start: 2020-02-10 | End: 2020-02-14

## 2020-02-10 RX ORDER — CEFEPIME 1 G/1
INJECTION, POWDER, FOR SOLUTION INTRAMUSCULAR; INTRAVENOUS
Refills: 0 | Status: DISCONTINUED | OUTPATIENT
Start: 2020-02-10 | End: 2020-02-12

## 2020-02-10 RX ORDER — CEFEPIME 1 G/1
2000 INJECTION, POWDER, FOR SOLUTION INTRAMUSCULAR; INTRAVENOUS ONCE
Refills: 0 | Status: COMPLETED | OUTPATIENT
Start: 2020-02-10 | End: 2020-02-10

## 2020-02-10 RX ORDER — CEFEPIME 1 G/1
2000 INJECTION, POWDER, FOR SOLUTION INTRAMUSCULAR; INTRAVENOUS EVERY 8 HOURS
Refills: 0 | Status: DISCONTINUED | OUTPATIENT
Start: 2020-02-10 | End: 2020-02-12

## 2020-02-10 RX ADMIN — Medication 650 MILLIGRAM(S): at 23:15

## 2020-02-10 RX ADMIN — Medication 650 MILLIGRAM(S): at 15:00

## 2020-02-10 RX ADMIN — BUDESONIDE AND FORMOTEROL FUMARATE DIHYDRATE 2 PUFF(S): 160; 4.5 AEROSOL RESPIRATORY (INHALATION) at 08:47

## 2020-02-10 RX ADMIN — Medication 400 MILLIGRAM(S): at 13:15

## 2020-02-10 RX ADMIN — GABAPENTIN 300 MILLIGRAM(S): 400 CAPSULE ORAL at 21:13

## 2020-02-10 RX ADMIN — Medication 3 MILLILITER(S): at 15:46

## 2020-02-10 RX ADMIN — Medication 400 MILLIGRAM(S): at 21:13

## 2020-02-10 RX ADMIN — Medication 650 MILLIGRAM(S): at 16:00

## 2020-02-10 RX ADMIN — CEFTRIAXONE 100 MILLIGRAM(S): 500 INJECTION, POWDER, FOR SOLUTION INTRAMUSCULAR; INTRAVENOUS at 05:40

## 2020-02-10 RX ADMIN — Medication 250 MILLIGRAM(S): at 05:39

## 2020-02-10 RX ADMIN — Medication 250 MILLIGRAM(S): at 17:19

## 2020-02-10 RX ADMIN — CEFEPIME 100 MILLIGRAM(S): 1 INJECTION, POWDER, FOR SOLUTION INTRAMUSCULAR; INTRAVENOUS at 21:14

## 2020-02-10 RX ADMIN — ATORVASTATIN CALCIUM 20 MILLIGRAM(S): 80 TABLET, FILM COATED ORAL at 21:13

## 2020-02-10 RX ADMIN — PREGABALIN 1000 MICROGRAM(S): 225 CAPSULE ORAL at 11:08

## 2020-02-10 RX ADMIN — BUDESONIDE AND FORMOTEROL FUMARATE DIHYDRATE 2 PUFF(S): 160; 4.5 AEROSOL RESPIRATORY (INHALATION) at 21:04

## 2020-02-10 RX ADMIN — TAMSULOSIN HYDROCHLORIDE 0.4 MILLIGRAM(S): 0.4 CAPSULE ORAL at 21:13

## 2020-02-10 RX ADMIN — Medication 1000 UNIT(S): at 11:08

## 2020-02-10 RX ADMIN — Medication 3 MILLILITER(S): at 09:14

## 2020-02-10 RX ADMIN — Medication 3 MILLILITER(S): at 21:03

## 2020-02-10 RX ADMIN — AZITHROMYCIN 500 MILLIGRAM(S): 500 TABLET, FILM COATED ORAL at 11:08

## 2020-02-10 RX ADMIN — Medication 650 MILLIGRAM(S): at 22:14

## 2020-02-10 RX ADMIN — HEPARIN SODIUM 5000 UNIT(S): 5000 INJECTION INTRAVENOUS; SUBCUTANEOUS at 17:20

## 2020-02-10 RX ADMIN — Medication 650 MILLIGRAM(S): at 09:07

## 2020-02-10 RX ADMIN — FLUDROCORTISONE ACETATE 0.1 MILLIGRAM(S): 0.1 TABLET ORAL at 05:39

## 2020-02-10 RX ADMIN — HEPARIN SODIUM 5000 UNIT(S): 5000 INJECTION INTRAVENOUS; SUBCUTANEOUS at 05:39

## 2020-02-10 RX ADMIN — Medication 650 MILLIGRAM(S): at 08:07

## 2020-02-10 RX ADMIN — FINASTERIDE 5 MILLIGRAM(S): 5 TABLET, FILM COATED ORAL at 11:08

## 2020-02-10 RX ADMIN — TIOTROPIUM BROMIDE 1 CAPSULE(S): 18 CAPSULE ORAL; RESPIRATORY (INHALATION) at 08:45

## 2020-02-10 RX ADMIN — Medication 400 MILLIGRAM(S): at 05:39

## 2020-02-10 RX ADMIN — CEFEPIME 100 MILLIGRAM(S): 1 INJECTION, POWDER, FOR SOLUTION INTRAMUSCULAR; INTRAVENOUS at 15:28

## 2020-02-10 NOTE — PROGRESS NOTE ADULT - ASSESSMENT
73yo male w. PMH COPD presents w. c/o SOB, generalized weakness found to have severe sepsis secondary to community acquired pneumonia.    #Left Lower lobe Pneumonia  Cont Zithromax/Ceftriaxone  viral panel negative for Flu A/B/RSV   Pt reported some blood streaks while coughing today, will continue to monitor as some blood streaks is expected due to underlying inflammation/pneumonia . If pt continues to have hemoptysis consider pulmonary eval . Also reported left sided chest pain which could also be due to infiltrate. stat trops negative, ecg :nonspecific st changes , unchanged from prior ekg. follow up serial ecg  Pain management  blood cx : neg, sputum cx: rare gram variable rods    #COPD: Stable  Supplemental 02 as needed, Wean as tolerated  Cont Singulair, Symbicort, Nebs    #Hypernatremia   encourage PO free water for 24 hours  monitor Na in AM    #HYpokalemia  repleted . repeat BMP AM    #Thrombocytopenia  Likely due to sepsis  monitor plts    #BPH  Cont Finasteride     #DVT ppx: S/c heparin

## 2020-02-10 NOTE — PROGRESS NOTE ADULT - PROBLEM SELECTOR PLAN 1
Sputum sample came with pseudomonas   Antibiotics switched from cetriaxone/azithromycin to cefepime 2/10 day 3/10 of antibiotics  Pulmonary consulted

## 2020-02-10 NOTE — CONSULT NOTE ADULT - SUBJECTIVE AND OBJECTIVE BOX
HPI:HPI:  74M PMH COPD on intermittent O2 (night and exertionally) presents for shortness of breath.  Patient states he was getting over a viral illness and felt back to normal on Wednesday but then yesterday felt like a truck hit him.  Patient states he has been having shortness of breath above baseline.  Patient also noted feeling generalized weakness.    Pt also started coughing up blood tinged sputum.  Also reports current pleuritic type chest pain with deep inspiration.   He is o2 dependent for approx 2 years.      PAST MEDICAL & SURGICAL HISTORY:  High cholesterol  COPD (chronic obstructive pulmonary disease)  H/O eye surgery  History of cholecystectomy      FAMILY HISTORY:  No pertinent family history in first degree relatives      SOCIAL HISTORY:  Smoking: quit 20 years ago.  EtOH Use: social  Marital Status:   Occupation: retired    Recent Travel: denies    Allergies    gold containing compounds (Unknown)  Onions (Unknown)    Intolerances        HOME  MEDICATIONS:Home Medications:  Advair Diskus 250 mcg-50 mcg inhalation powder: inhaled 2 times a day (07 Feb 2020 14:53)  Apriso 0.375 g oral capsule, extended release: 4 cap(s) orally once a day (in the morning) (07 Feb 2020 14:53)  atorvastatin 20 mg oral tablet: 1 tab(s) orally once a day (07 Feb 2020 14:53)  Flomax 0.4 mg oral capsule: 1 cap(s) orally once a day (07 Feb 2020 14:53)  Florastor 250 mg oral capsule: 1 cap(s) orally once a day (07 Feb 2020 14:53)  Florinef Acetate 0.1 mg oral tablet: 1 tab(s) orally once a day (07 Feb 2020 14:53)  gabapentin 300 mg oral capsule: orally once a day (07 Feb 2020 14:53)  ProAir HFA 90 mcg/inh inhalation aerosol: 2 puff(s) inhaled every 6 hours, As Needed (07 Feb 2020 14:53)  Proscar 5 mg oral tablet: 1 tab(s) orally once a day (07 Feb 2020 14:53)  Spiriva 18 mcg inhalation capsule: 1 cap(s) inhaled once a day (07 Feb 2020 14:53)  Vitamin B-12: orally once a day (07 Feb 2020 14:53)  Vitamin D3: orally once a day (07 Feb 2020 14:53)      REVIEW OF SYSTEMS:  Constitutional: No fevers or chills. No weight loss. .  Eyes: No itching or discharge from the eyes  ENT: . No nasal congestion. No post nasal drip  CV: wnl  Resp:Pleuritic chest pain., dyspnea and cough   GI: No nausea. No vomiting. No diarrhea.  MSK: No joint pain or pain in any extremities  Integumentary: No skin lesions. No pedal edema.  Neurological: No gross motor weakness. No sensory changes.        OBJECTIVE:  ICU Vital Signs Last 24 Hrs  T(C): 36.6 (10 Feb 2020 05:22), Max: 36.8 (09 Feb 2020 21:22)  T(F): 97.9 (10 Feb 2020 05:22), Max: 98.2 (09 Feb 2020 21:22)  HR: 69 (10 Feb 2020 09:15) (62 - 70)  BP: 143/76 (10 Feb 2020 05:22) (143/76 - 145/78)  BP(mean): --  ABP: --  ABP(mean): --  RR: 15 (10 Feb 2020 05:22) (14 - 15)  SpO2: 93% (10 Feb 2020 09:15) (93% - 98%)        02-09 @ 07:01  -  02-10 @ 07:00  --------------------------------------------------------  IN: 1500 mL / OUT: 0 mL / NET: 1500 mL    02-10 @ 07:01  -  02-10 @ 15:11  --------------------------------------------------------  IN: 1400 mL / OUT: 0 mL / NET: 1400 mL      CAPILLARY BLOOD GLUCOSE          PHYSICAL EXAM:  General: Awake, alert, oriented X 3.   HEENT: Atraumatic, normocephalic.                            .  Lymph Nodes: No palpable lymphadenopathy  Neck: No JVD. No carotid bruit.   Respiratory: normal    Cardiovascular: S1 S2 normal. No murmurs, rubs or gallops.   Abdomen: Soft, non-tender,   Extremities: Warm to touch.    Skin: No rashes or skin lesions  Neurological: Motor and sensory examination equal and normal in all four extremities.  Psychiatry: Appropriate mood and affect.    HOSPITAL MEDICATIONS:  MEDICATIONS  (STANDING):  albuterol/ipratropium for Nebulization 3 milliLiter(s) Nebulizer every 6 hours  atorvastatin 20 milliGRAM(s) Oral at bedtime  budesonide 160 MICROgram(s)/formoterol 4.5 MICROgram(s) Inhaler 2 Puff(s) Inhalation two times a day  cefepime   IVPB 2000 milliGRAM(s) IV Intermittent once  cefepime   IVPB 2000 milliGRAM(s) IV Intermittent every 8 hours  cefepime   IVPB      cholecalciferol 1000 Unit(s) Oral daily  cyanocobalamin 1000 MICROGram(s) Oral daily  finasteride 5 milliGRAM(s) Oral daily  fludroCORTISONE 0.1 milliGRAM(s) Oral daily  gabapentin 300 milliGRAM(s) Oral daily  heparin  Injectable 5000 Unit(s) SubCutaneous every 12 hours  mesalamine DR Capsule 400 milliGRAM(s) Oral three times a day  saccharomyces boulardii 250 milliGRAM(s) Oral two times a day  tamsulosin 0.4 milliGRAM(s) Oral at bedtime  tiotropium 18 MICROgram(s) Capsule 1 Capsule(s) Inhalation daily    MEDICATIONS  (PRN):  acetaminophen   Tablet .. 650 milliGRAM(s) Oral every 6 hours PRN Temp greater or equal to 38C (100.4F), Mild Pain (1 - 3), Moderate Pain (4 - 6)      LABS:                        12.2   8.29  )-----------( 143      ( 10 Feb 2020 07:34 )             37.1     02-10    145  |  107  |  16  ----------------------------<  88  3.9   |  31  |  0.88    Ca    8.3<L>      10 Feb 2020 07:34                MICROBIOLOGY:     RADIOLOGY:  [ ] Reviewed and interpreted by me   < from: Xray Chest 1 View- PORTABLE-Urgent (02.09.20 @ 14:44) >    EXAM:  XR CHEST PORTABLE URGENT 1V      PROCEDURE DATE:  02/09/2020        INTERPRETATION:  CLINICAL STATEMENT: Follow-up chest pain.    TECHNIQUE: AP view of the chest.    COMPARISON: 2/7/2020    FINDINGS/  IMPRESSION:  Opacity left lung base slightly improving. No pleural effusion    Heart size within normal limits.                  EMILY WONG M.D., ATTENDING RADIOLOGIST  This document has been electronically signed. Feb 9 2020  3:24PM    < end of copied text >    EKG:

## 2020-02-10 NOTE — CONSULT NOTE ADULT - ASSESSMENT
75yo male w. PMH COPD presents w. c/o SOB, generalized weakness found to have severe sepsis secondary to community acquired pneumonia.    #Left Lower lobe Pneumonia  Cont Zithromax/Ceftriaxone  viral panel negative for Flu A/B/RSV   Pt reported some blood streaks while coughing today, will continue to monitor as some blood streaks is expected due to underlying inflammation/pneumonia . If pt continues to have hemoptysis consider pulmonary eval . Also reported left sided chest pain which could also be due to infiltrate. stat trops negative, ecg :nonspecific st changes , unchanged from prior ekg. follow up serial ecg  Pain management  blood cx : neg, sputum cx: rare gram variable rods  # Hemoptysis: cta ordered.  #COPD: Stable  Supplemental 02 as needed, Wean as tolerated  Cont Singulair, Symbicort, Nebs    #Hypernatremia   encourage PO free water for 24 hours  monitor Na in AM    #HYpokalemia  repleted . repeat BMP AM    #Thrombocytopenia  Likely due to sepsis  monitor plts    #BPH  Cont Finasteride     #DVT ppx: S/c heparin

## 2020-02-10 NOTE — PROGRESS NOTE ADULT - SUBJECTIVE AND OBJECTIVE BOX
Patient is a 74y old  Male who presents with a chief complaint of sob/weakness/fever (2020 15:32)      Patient seen and examined at bedside.  Patient reports continue to have shortness of breath, reports left sided chest pain is associated with coughing.      ALLERGIES:  gold containing compounds (Unknown)  Onions (Unknown)    MEDICATIONS:  acetaminophen   Tablet .. 650 milliGRAM(s) Oral every 6 hours PRN  albuterol/ipratropium for Nebulization 3 milliLiter(s) Nebulizer every 6 hours  atorvastatin 20 milliGRAM(s) Oral at bedtime  budesonide 160 MICROgram(s)/formoterol 4.5 MICROgram(s) Inhaler 2 Puff(s) Inhalation two times a day  cefepime   IVPB      cholecalciferol 1000 Unit(s) Oral daily  cyanocobalamin 1000 MICROGram(s) Oral daily  finasteride 5 milliGRAM(s) Oral daily  fludroCORTISONE 0.1 milliGRAM(s) Oral daily  gabapentin 300 milliGRAM(s) Oral daily  heparin  Injectable 5000 Unit(s) SubCutaneous every 12 hours  mesalamine DR Capsule 400 milliGRAM(s) Oral three times a day  saccharomyces boulardii 250 milliGRAM(s) Oral two times a day  tamsulosin 0.4 milliGRAM(s) Oral at bedtime  tiotropium 18 MICROgram(s) Capsule 1 Capsule(s) Inhalation daily    Vital Signs Last 24 Hrs  T(F): 97.9 (10 Feb 2020 05:22), Max: 98.2 (2020 21:22)  HR: 69 (10 Feb 2020 09:15) (62 - 70)  BP: 143/76 (10 Feb 2020 05:22) (143/76 - 145/78)  RR: 15 (10 Feb 2020 05:22) (14 - 15)  SpO2: 93% (10 Feb 2020 09:15) (93% - 98%)  I&O's Summary    2020 07:01  -  10 Feb 2020 07:00  --------------------------------------------------------  IN: 1500 mL / OUT: 0 mL / NET: 1500 mL    10 Feb 2020 07:01  -  10 Feb 2020 14:52  --------------------------------------------------------  IN: 1400 mL / OUT: 0 mL / NET: 1400 mL        PHYSICAL EXAM:  General: NAD, A/O x 3  ENT: MMM  Neck: Supple, No JVD  Lungs: poor air entry at lower lobes, inspiratory wheeze, no crackle   Cardio: RRR, S1/S2, No murmurs  Abdomen: Soft, Nontender, Nondistended; Bowel sounds present  Extremities: No cyanosis, No edema    LABS:                        12.2   8.29  )-----------( 143      ( 10 Feb 2020 07:34 )             37.1     02-10    145  |  107  |  16  ----------------------------<  88  3.9   |  31  |  0.88    Ca    8.3      10 Feb 2020 07:34    TPro  6.3  /  Alb  2.6  /  TBili  0.5  /  DBili  x   /  AST  10  /  ALT  13  /  AlkPhos  67  02-08    eGFR if Non African American: 83 mL/min/1.73M2 (02-10-20 @ 07:34)  eGFR if : 97 mL/min/1.73M2 (02-10-20 @ 07:34)      Lactate, Blood: 1.1 mmol/L ( @ 15:15)    CARDIAC MARKERS ( 2020 19:15 )  <.017 ng/mL / x     / x     / x     / x      CARDIAC MARKERS ( 2020 14:25 )  <.017 ng/mL / x     / x     / x     / x                            Urinalysis Basic - ( 2020 22:20 )    Color: Yellow / Appearance: Clear / S.020 / pH: x  Gluc: x / Ketone: Small  / Bili: Negative / Urobili: Negative   Blood: x / Protein: 15 / Nitrite: Negative   Leuk Esterase: Negative / RBC: Negative /HPF / WBC 0-2 /HPF   Sq Epi: x / Non Sq Epi: Neg.-Few / Bacteria: Negative /HPF        Culture - Sputum (collected 2020 13:15)  Source: .Sputum Sputum  Gram Stain (2020 22:46):    Rare polymorphonuclear leukocytes per low power field    No Squamous epithelial cells per low power field    Rare Gram Variable Rods per oil power field  Preliminary Report (2020 19:19):    Rare Pseudomonas aeruginosa    Normal Respiratory Nia present    Culture - Urine (collected 2020 22:20)  Source: .Urine Clean Catch (Midstream)  Final Report (2020 10:26):    <10,000 CFU/mL Normal Urogenital Nia    Culture - Blood (collected 2020 15:15)  Source: .Blood Blood-Peripheral  Preliminary Report (2020 22:01):    No growth to date.    Culture - Blood (collected 2020 15:15)  Source: .Blood Blood-Peripheral  Preliminary Report (2020 22:01):    No growth to date.        RADIOLOGY & ADDITIONAL TESTS:    Care Discussed with Consultants/Other Providers:

## 2020-02-11 LAB
ANION GAP SERPL CALC-SCNC: 3 MMOL/L — LOW (ref 5–17)
BASOPHILS # BLD AUTO: 0.02 K/UL — SIGNIFICANT CHANGE UP (ref 0–0.2)
BASOPHILS NFR BLD AUTO: 0.3 % — SIGNIFICANT CHANGE UP (ref 0–2)
BUN SERPL-MCNC: 16 MG/DL — SIGNIFICANT CHANGE UP (ref 7–23)
CALCIUM SERPL-MCNC: 8.8 MG/DL — SIGNIFICANT CHANGE UP (ref 8.4–10.5)
CHLORIDE SERPL-SCNC: 108 MMOL/L — SIGNIFICANT CHANGE UP (ref 96–108)
CO2 SERPL-SCNC: 34 MMOL/L — HIGH (ref 22–31)
CREAT SERPL-MCNC: 0.93 MG/DL — SIGNIFICANT CHANGE UP (ref 0.5–1.3)
EOSINOPHIL # BLD AUTO: 0.17 K/UL — SIGNIFICANT CHANGE UP (ref 0–0.5)
EOSINOPHIL NFR BLD AUTO: 2.7 % — SIGNIFICANT CHANGE UP (ref 0–6)
GLUCOSE SERPL-MCNC: 108 MG/DL — HIGH (ref 70–99)
HCT VFR BLD CALC: 39.4 % — SIGNIFICANT CHANGE UP (ref 39–50)
HGB BLD-MCNC: 13 G/DL — SIGNIFICANT CHANGE UP (ref 13–17)
IMM GRANULOCYTES NFR BLD AUTO: 0.3 % — SIGNIFICANT CHANGE UP (ref 0–1.5)
LYMPHOCYTES # BLD AUTO: 1.46 K/UL — SIGNIFICANT CHANGE UP (ref 1–3.3)
LYMPHOCYTES # BLD AUTO: 23 % — SIGNIFICANT CHANGE UP (ref 13–44)
MCHC RBC-ENTMCNC: 31.3 PG — SIGNIFICANT CHANGE UP (ref 27–34)
MCHC RBC-ENTMCNC: 33 GM/DL — SIGNIFICANT CHANGE UP (ref 32–36)
MCV RBC AUTO: 94.9 FL — SIGNIFICANT CHANGE UP (ref 80–100)
MONOCYTES # BLD AUTO: 0.57 K/UL — SIGNIFICANT CHANGE UP (ref 0–0.9)
MONOCYTES NFR BLD AUTO: 9 % — SIGNIFICANT CHANGE UP (ref 2–14)
NEUTROPHILS # BLD AUTO: 4.12 K/UL — SIGNIFICANT CHANGE UP (ref 1.8–7.4)
NEUTROPHILS NFR BLD AUTO: 64.7 % — SIGNIFICANT CHANGE UP (ref 43–77)
NRBC # BLD: 0 /100 WBCS — SIGNIFICANT CHANGE UP (ref 0–0)
PLATELET # BLD AUTO: 156 K/UL — SIGNIFICANT CHANGE UP (ref 150–400)
POTASSIUM SERPL-MCNC: 3.7 MMOL/L — SIGNIFICANT CHANGE UP (ref 3.5–5.3)
POTASSIUM SERPL-SCNC: 3.7 MMOL/L — SIGNIFICANT CHANGE UP (ref 3.5–5.3)
RBC # BLD: 4.15 M/UL — LOW (ref 4.2–5.8)
RBC # FLD: 13.2 % — SIGNIFICANT CHANGE UP (ref 10.3–14.5)
SODIUM SERPL-SCNC: 145 MMOL/L — SIGNIFICANT CHANGE UP (ref 135–145)
WBC # BLD: 6.36 K/UL — SIGNIFICANT CHANGE UP (ref 3.8–10.5)
WBC # FLD AUTO: 6.36 K/UL — SIGNIFICANT CHANGE UP (ref 3.8–10.5)

## 2020-02-11 PROCEDURE — 99233 SBSQ HOSP IP/OBS HIGH 50: CPT

## 2020-02-11 PROCEDURE — 99232 SBSQ HOSP IP/OBS MODERATE 35: CPT

## 2020-02-11 RX ORDER — LISINOPRIL 2.5 MG/1
5 TABLET ORAL DAILY
Refills: 0 | Status: DISCONTINUED | OUTPATIENT
Start: 2020-02-11 | End: 2020-02-14

## 2020-02-11 RX ADMIN — HEPARIN SODIUM 5000 UNIT(S): 5000 INJECTION INTRAVENOUS; SUBCUTANEOUS at 17:45

## 2020-02-11 RX ADMIN — Medication 3 MILLILITER(S): at 16:44

## 2020-02-11 RX ADMIN — Medication 1000 UNIT(S): at 12:57

## 2020-02-11 RX ADMIN — TIOTROPIUM BROMIDE 1 CAPSULE(S): 18 CAPSULE ORAL; RESPIRATORY (INHALATION) at 07:52

## 2020-02-11 RX ADMIN — CEFEPIME 100 MILLIGRAM(S): 1 INJECTION, POWDER, FOR SOLUTION INTRAMUSCULAR; INTRAVENOUS at 21:16

## 2020-02-11 RX ADMIN — Medication 3 MILLILITER(S): at 21:27

## 2020-02-11 RX ADMIN — TAMSULOSIN HYDROCHLORIDE 0.4 MILLIGRAM(S): 0.4 CAPSULE ORAL at 21:16

## 2020-02-11 RX ADMIN — HEPARIN SODIUM 5000 UNIT(S): 5000 INJECTION INTRAVENOUS; SUBCUTANEOUS at 05:10

## 2020-02-11 RX ADMIN — PREGABALIN 1000 MICROGRAM(S): 225 CAPSULE ORAL at 12:57

## 2020-02-11 RX ADMIN — Medication 250 MILLIGRAM(S): at 17:45

## 2020-02-11 RX ADMIN — Medication 400 MILLIGRAM(S): at 21:16

## 2020-02-11 RX ADMIN — ATORVASTATIN CALCIUM 20 MILLIGRAM(S): 80 TABLET, FILM COATED ORAL at 21:16

## 2020-02-11 RX ADMIN — BUDESONIDE AND FORMOTEROL FUMARATE DIHYDRATE 2 PUFF(S): 160; 4.5 AEROSOL RESPIRATORY (INHALATION) at 21:27

## 2020-02-11 RX ADMIN — BUDESONIDE AND FORMOTEROL FUMARATE DIHYDRATE 2 PUFF(S): 160; 4.5 AEROSOL RESPIRATORY (INHALATION) at 07:52

## 2020-02-11 RX ADMIN — LISINOPRIL 5 MILLIGRAM(S): 2.5 TABLET ORAL at 17:45

## 2020-02-11 RX ADMIN — FINASTERIDE 5 MILLIGRAM(S): 5 TABLET, FILM COATED ORAL at 12:57

## 2020-02-11 RX ADMIN — CEFEPIME 100 MILLIGRAM(S): 1 INJECTION, POWDER, FOR SOLUTION INTRAMUSCULAR; INTRAVENOUS at 05:10

## 2020-02-11 RX ADMIN — CEFEPIME 100 MILLIGRAM(S): 1 INJECTION, POWDER, FOR SOLUTION INTRAMUSCULAR; INTRAVENOUS at 12:56

## 2020-02-11 RX ADMIN — Medication 400 MILLIGRAM(S): at 05:10

## 2020-02-11 RX ADMIN — FLUDROCORTISONE ACETATE 0.1 MILLIGRAM(S): 0.1 TABLET ORAL at 05:10

## 2020-02-11 RX ADMIN — Medication 3 MILLILITER(S): at 07:52

## 2020-02-11 RX ADMIN — GABAPENTIN 300 MILLIGRAM(S): 400 CAPSULE ORAL at 12:57

## 2020-02-11 RX ADMIN — Medication 250 MILLIGRAM(S): at 05:10

## 2020-02-11 RX ADMIN — Medication 3 MILLILITER(S): at 04:50

## 2020-02-11 RX ADMIN — Medication 400 MILLIGRAM(S): at 12:56

## 2020-02-11 NOTE — PROGRESS NOTE ADULT - ASSESSMENT
73yo male w. PMH COPD presents w. c/o SOB, generalized weakness found to have severe sepsis secondary to community acquired pneumonia.

## 2020-02-11 NOTE — PROGRESS NOTE ADULT - ASSESSMENT
75yo male w. PMH COPD presents w. c/o SOB, generalized weakness found to have severe sepsis secondary to community acquired pneumonia.

## 2020-02-11 NOTE — PROGRESS NOTE ADULT - SUBJECTIVE AND OBJECTIVE BOX
Patient is a 74y old  Male who presents with a chief complaint of sob/weakness/fever (10 Feb 2020 15:10)      Patient seen and examined at bedside.  Patient states he feels that his breathing has improved, denies any chest pain     ALLERGIES:  gold containing compounds (Unknown)  Onions (Unknown)    MEDICATIONS:  acetaminophen   Tablet .. 650 milliGRAM(s) Oral every 6 hours PRN  albuterol/ipratropium for Nebulization 3 milliLiter(s) Nebulizer every 6 hours  atorvastatin 20 milliGRAM(s) Oral at bedtime  budesonide 160 MICROgram(s)/formoterol 4.5 MICROgram(s) Inhaler 2 Puff(s) Inhalation two times a day  cefepime   IVPB 2000 milliGRAM(s) IV Intermittent every 8 hours  cefepime   IVPB      cholecalciferol 1000 Unit(s) Oral daily  cyanocobalamin 1000 MICROGram(s) Oral daily  finasteride 5 milliGRAM(s) Oral daily  fludroCORTISONE 0.1 milliGRAM(s) Oral daily  gabapentin 300 milliGRAM(s) Oral daily  heparin  Injectable 5000 Unit(s) SubCutaneous every 12 hours  mesalamine DR Capsule 400 milliGRAM(s) Oral three times a day  saccharomyces boulardii 250 milliGRAM(s) Oral two times a day  tamsulosin 0.4 milliGRAM(s) Oral at bedtime  tiotropium 18 MICROgram(s) Capsule 1 Capsule(s) Inhalation daily    Vital Signs Last 24 Hrs  T(F): 97.5 (11 Feb 2020 04:30), Max: 97.8 (10 Feb 2020 16:44)  HR: 68 (11 Feb 2020 07:55) (66 - 81)  BP: 154/83 (11 Feb 2020 05:47) (154/78 - 182/91)  RR: 17 (11 Feb 2020 04:30) (15 - 17)  SpO2: 96% (11 Feb 2020 07:55) (94% - 98%)  I&O's Summary    10 Feb 2020 07:01  -  11 Feb 2020 07:00  --------------------------------------------------------  IN: 1400 mL / OUT: 0 mL / NET: 1400 mL        PHYSICAL EXAM:  General: NAD, A/O x 3  ENT: MMM  Neck: Supple, No JVD  Lungs: expiratory wheeze, poor air entry   Cardio: RRR, S1/S2, No murmurs  Abdomen: Soft, Nontender, Nondistended; Bowel sounds present  Extremities: No cyanosis, No edema    LABS:                        13.0   6.36  )-----------( 156      ( 11 Feb 2020 05:44 )             39.4     02-11    145  |  108  |  16  ----------------------------<  108  3.7   |  34  |  0.93    Ca    8.8      11 Feb 2020 05:44      eGFR if Non African American: 79 mL/min/1.73M2 (02-11-20 @ 05:44)  eGFR if African American: 92 mL/min/1.73M2 (02-11-20 @ 05:44)        CARDIAC MARKERS ( 09 Feb 2020 19:15 )  <.017 ng/mL / x     / x     / x     / x      CARDIAC MARKERS ( 09 Feb 2020 14:25 )  <.017 ng/mL / x     / x     / x     / x                                Culture - Sputum (collected 08 Feb 2020 13:15)  Source: .Sputum Sputum  Gram Stain (08 Feb 2020 22:46):    Rare polymorphonuclear leukocytes per low power field    No Squamous epithelial cells per low power field    Rare Gram Variable Rods per oil power field  Final Report (10 Feb 2020 20:23):    Rare Pseudomonas aeruginosa    Normal Respiratory Nia present  Organism: Pseudomonas aeruginosa (10 Feb 2020 20:23)  Organism: Pseudomonas aeruginosa (10 Feb 2020 20:23)      -  Amikacin: S <=16      -  Aztreonam: S <=4      -  Cefepime: S <=2      -  Ceftazidime: S 4      -  Ciprofloxacin: S <=1      -  Gentamicin: S 4      -  Imipenem: S <=1      -  Levofloxacin: S <=2      -  Meropenem: S 2      -  Piperacillin/Tazobactam: S <=8      -  Tobramycin: S <=2      Method Type: BRAEDEN    Culture - Urine (collected 07 Feb 2020 22:20)  Source: .Urine Clean Catch (Midstream)  Final Report (09 Feb 2020 10:26):    <10,000 CFU/mL Normal Urogenital Nia    Culture - Blood (collected 07 Feb 2020 15:15)  Source: .Blood Blood-Peripheral  Preliminary Report (08 Feb 2020 22:01):    No growth to date.    Culture - Blood (collected 07 Feb 2020 15:15)  Source: .Blood Blood-Peripheral  Preliminary Report (08 Feb 2020 22:01):    No growth to date.        RADIOLOGY & ADDITIONAL TESTS:  < from: CT Angio Chest w/ IV Cont (02.10.20 @ 15:16) >  Impression:    The thoracic aorta demonstrates no evidence for dissection or aneurysm. Chronic infrarenal type B abdominal aortic dissection, unchanged from previous abdominal CT dated 4/6/2012.     No suspicious filling defect in the pulmonary arteries to suggest pulmonary embolism.    Mild bilateral pleural effusions, left greater than right.     Mildly enlarged 1.1 cm right hilarlymph node.    New patchy consolidations in both lungs, left greater than right. Small density in the left mainstem bronchus may represent mucous material. Follow chest CT in 4-6 weeks may be pursued to ensure resolution.    COPD.    < end of copied text >    Care Discussed with Consultants/Other Providers:

## 2020-02-11 NOTE — PROGRESS NOTE ADULT - SUBJECTIVE AND OBJECTIVE BOX
Follow-up Pulm Progress Note    Jose Luis Gallego MD  [phone]    No new respiratory events overnight.  Denies SOB/CP. Less cough, dyspnea.    Medications:  Vital Signs Last 24 Hrs  T(C): 36.4 (11 Feb 2020 04:30), Max: 36.6 (10 Feb 2020 16:44)  T(F): 97.5 (11 Feb 2020 04:30), Max: 97.8 (10 Feb 2020 16:44)  HR: 68 (11 Feb 2020 07:55) (66 - 81)  BP: 154/83 (11 Feb 2020 05:47) (154/78 - 182/91)  BP(mean): --  RR: 17 (11 Feb 2020 04:30) (15 - 17)  SpO2: 96% (11 Feb 2020 07:55) (94% - 98%)          02-10 @ 07:01  -  02-11 @ 07:00  --------------------------------------------------------  IN: 1400 mL / OUT: 0 mL / NET: 1400 mL        LABS:                        13.0   6.36  )-----------( 156      ( 11 Feb 2020 05:44 )             39.4     02-11    145  |  108  |  16  ----------------------------<  108<H>  3.7   |  34<H>  |  0.93    Ca    8.8      11 Feb 2020 05:44                CULTURES:  Culture Results:   Rare Pseudomonas aeruginosa  Normal Respiratory Nia present (02-08 @ 13:15)  Culture Results:   <10,000 CFU/mL Normal Urogenital Nia (02-07 @ 22:20)  Culture Results:   No growth to date. (02-07 @ 15:15)  Culture Results:   No growth to date. (02-07 @ 15:15)    Most recent blood culture -- 02-08 @ 13:15   Pseudomonas aeruginosa Pseudomonas aeruginosa .Sputum Sputum 02-08 @ 13:15  Most recent blood culture -- 02-07 @ 22:20   -- -- .Urine Clean Catch (Midstream) 02-07 @ 22:20  Most recent blood culture -- 02-07 @ 15:15   -- -- .Blood Blood-Peripheral 02-07 @ 15:15      Physical Examination:  PULM:slight wheezing  CVS: Regular rate and rhythm, no murmurs, rubs, or gallops  ABD: Soft, non-tender  EXT:  No clubbing, cyanosis, or edema    RADIOLOGY REVIEWED  CXR:    CT chest:< from: CT Angio Chest w/ IV Cont (02.10.20 @ 15:16) >  EXAM:  CT ANGIO CHEST (W)AW IC      PROCEDURE DATE:  02/10/2020        INTERPRETATION:  Pulmonary CTA    Indication: Sudden onset dyspnea. Hemoptysis..    Technique: Axial multidetector CT images of the chest are acquired from the thoracic inlet tothe upper abdomen following the administration of IV contrast ( 75cc Omnipaque-350, 25cc discarded) according to our pulmonary embolism protocol. Subsequent MIP is also reconstructed for evaluation.    Comparison: Noncontrast chest CT dated 5/3/2018.    Findings: The thoracic aorta demonstrates no evidence for dissection or aneurysm. Aortic and coronary artery calcifications are present. There is a chronic infrarenal type B abdominal aortic dissection, unchanged from previous abdominal CT dated 4/6/2012. No suspicious filling defect in the pulmonary arteries to suggest pulmonary embolism.    Mild bilateral pleural effusions, left greater than right. No pericardial effusion. The heart is not enlarged. Mildly enlarged 1.1 cm right hilar lymph node. No enlarged mediastinal, or axillary lymph node.    The trachea and central bronchi are patent. Small density in the left mainstem bronchus may represent mucous material.     Grossly stable emphysematous changes in both lungs, compatible with COPD. No evidence for pneumothorax. Small bilateral atelectasis. New patchy consolidations in both lungs, left greater than right.     Limited sections through the upper abdomen demonstrate cholecystectomy clips. Small left renal cysts measuring up to 1.7 cm. There is another small partially visualized hypodense lesion in the left kidney, too small to characterize.    Impression:    The thoracic aorta demonstrates no evidence for dissection or aneurysm. Chronic infrarenal type B abdominal aortic dissection, unchanged from previous abdominal CT dated 4/6/2012.     No suspicious filling defect in the pulmonary arteries to suggest pulmonary embolism.    Mild bilateral pleural effusions, left greater than right.     Mildly enlarged 1.1 cm right hilarlymph node.    New patchy consolidations in both lungs, left greater than right. Small density in the left mainstem bronchus may represent mucous material. Follow chest CT in 4-6 weeks may be pursued to ensure resolution.    COPD.                  SHERYL MACARIO M.D., ATTENDING RADIOLOGIST  This document has been electronically signed. Feb 10 2020  4:47PM    < end of copied text >      TTE:

## 2020-02-11 NOTE — PROGRESS NOTE ADULT - PROBLEM SELECTOR PLAN 1
Sputum sample came with pseudomonas   Antibiotics switched from cetriaxone/azithromycin to cefepime 2/10 day 4/10 of antibiotics  Pulmonary consulted  Ct 2/10 showed patchy consolidations bilaterally recommend repeat in 4-6 weeks

## 2020-02-12 DIAGNOSIS — R07.81 PLEURODYNIA: ICD-10-CM

## 2020-02-12 LAB
ANION GAP SERPL CALC-SCNC: 8 MMOL/L — SIGNIFICANT CHANGE UP (ref 5–17)
BASOPHILS # BLD AUTO: 0.01 K/UL — SIGNIFICANT CHANGE UP (ref 0–0.2)
BASOPHILS NFR BLD AUTO: 0.2 % — SIGNIFICANT CHANGE UP (ref 0–2)
BUN SERPL-MCNC: 13 MG/DL — SIGNIFICANT CHANGE UP (ref 7–23)
CALCIUM SERPL-MCNC: 9.1 MG/DL — SIGNIFICANT CHANGE UP (ref 8.4–10.5)
CHLORIDE SERPL-SCNC: 108 MMOL/L — SIGNIFICANT CHANGE UP (ref 96–108)
CO2 SERPL-SCNC: 30 MMOL/L — SIGNIFICANT CHANGE UP (ref 22–31)
CREAT SERPL-MCNC: 0.9 MG/DL — SIGNIFICANT CHANGE UP (ref 0.5–1.3)
CULTURE RESULTS: SIGNIFICANT CHANGE UP
CULTURE RESULTS: SIGNIFICANT CHANGE UP
EOSINOPHIL # BLD AUTO: 0.3 K/UL — SIGNIFICANT CHANGE UP (ref 0–0.5)
EOSINOPHIL NFR BLD AUTO: 4.7 % — SIGNIFICANT CHANGE UP (ref 0–6)
GLUCOSE SERPL-MCNC: 101 MG/DL — HIGH (ref 70–99)
HCT VFR BLD CALC: 37.5 % — LOW (ref 39–50)
HGB BLD-MCNC: 12.6 G/DL — LOW (ref 13–17)
IMM GRANULOCYTES NFR BLD AUTO: 0.3 % — SIGNIFICANT CHANGE UP (ref 0–1.5)
LYMPHOCYTES # BLD AUTO: 1.24 K/UL — SIGNIFICANT CHANGE UP (ref 1–3.3)
LYMPHOCYTES # BLD AUTO: 19.6 % — SIGNIFICANT CHANGE UP (ref 13–44)
MCHC RBC-ENTMCNC: 31.7 PG — SIGNIFICANT CHANGE UP (ref 27–34)
MCHC RBC-ENTMCNC: 33.6 GM/DL — SIGNIFICANT CHANGE UP (ref 32–36)
MCV RBC AUTO: 94.2 FL — SIGNIFICANT CHANGE UP (ref 80–100)
MONOCYTES # BLD AUTO: 0.61 K/UL — SIGNIFICANT CHANGE UP (ref 0–0.9)
MONOCYTES NFR BLD AUTO: 9.6 % — SIGNIFICANT CHANGE UP (ref 2–14)
NEUTROPHILS # BLD AUTO: 4.16 K/UL — SIGNIFICANT CHANGE UP (ref 1.8–7.4)
NEUTROPHILS NFR BLD AUTO: 65.6 % — SIGNIFICANT CHANGE UP (ref 43–77)
NRBC # BLD: 0 /100 WBCS — SIGNIFICANT CHANGE UP (ref 0–0)
PLATELET # BLD AUTO: 157 K/UL — SIGNIFICANT CHANGE UP (ref 150–400)
POTASSIUM SERPL-MCNC: 3.6 MMOL/L — SIGNIFICANT CHANGE UP (ref 3.5–5.3)
POTASSIUM SERPL-SCNC: 3.6 MMOL/L — SIGNIFICANT CHANGE UP (ref 3.5–5.3)
RBC # BLD: 3.98 M/UL — LOW (ref 4.2–5.8)
RBC # FLD: 13.1 % — SIGNIFICANT CHANGE UP (ref 10.3–14.5)
SODIUM SERPL-SCNC: 146 MMOL/L — HIGH (ref 135–145)
SPECIMEN SOURCE: SIGNIFICANT CHANGE UP
SPECIMEN SOURCE: SIGNIFICANT CHANGE UP
WBC # BLD: 6.34 K/UL — SIGNIFICANT CHANGE UP (ref 3.8–10.5)
WBC # FLD AUTO: 6.34 K/UL — SIGNIFICANT CHANGE UP (ref 3.8–10.5)

## 2020-02-12 PROCEDURE — 93010 ELECTROCARDIOGRAM REPORT: CPT

## 2020-02-12 PROCEDURE — 99222 1ST HOSP IP/OBS MODERATE 55: CPT

## 2020-02-12 PROCEDURE — 71045 X-RAY EXAM CHEST 1 VIEW: CPT | Mod: 26

## 2020-02-12 PROCEDURE — 99233 SBSQ HOSP IP/OBS HIGH 50: CPT

## 2020-02-12 PROCEDURE — 99232 SBSQ HOSP IP/OBS MODERATE 35: CPT

## 2020-02-12 PROCEDURE — 93306 TTE W/DOPPLER COMPLETE: CPT | Mod: 26

## 2020-02-12 RX ORDER — CIPROFLOXACIN LACTATE 400MG/40ML
500 VIAL (ML) INTRAVENOUS EVERY 12 HOURS
Refills: 0 | Status: DISCONTINUED | OUTPATIENT
Start: 2020-02-12 | End: 2020-02-14

## 2020-02-12 RX ADMIN — HEPARIN SODIUM 5000 UNIT(S): 5000 INJECTION INTRAVENOUS; SUBCUTANEOUS at 05:29

## 2020-02-12 RX ADMIN — Medication 1000 UNIT(S): at 14:23

## 2020-02-12 RX ADMIN — BUDESONIDE AND FORMOTEROL FUMARATE DIHYDRATE 2 PUFF(S): 160; 4.5 AEROSOL RESPIRATORY (INHALATION) at 08:41

## 2020-02-12 RX ADMIN — Medication 650 MILLIGRAM(S): at 11:01

## 2020-02-12 RX ADMIN — LISINOPRIL 5 MILLIGRAM(S): 2.5 TABLET ORAL at 05:28

## 2020-02-12 RX ADMIN — Medication 250 MILLIGRAM(S): at 05:28

## 2020-02-12 RX ADMIN — Medication 400 MILLIGRAM(S): at 14:24

## 2020-02-12 RX ADMIN — ATORVASTATIN CALCIUM 20 MILLIGRAM(S): 80 TABLET, FILM COATED ORAL at 21:17

## 2020-02-12 RX ADMIN — CEFEPIME 100 MILLIGRAM(S): 1 INJECTION, POWDER, FOR SOLUTION INTRAMUSCULAR; INTRAVENOUS at 05:29

## 2020-02-12 RX ADMIN — Medication 400 MILLIGRAM(S): at 05:28

## 2020-02-12 RX ADMIN — TAMSULOSIN HYDROCHLORIDE 0.4 MILLIGRAM(S): 0.4 CAPSULE ORAL at 21:17

## 2020-02-12 RX ADMIN — Medication 650 MILLIGRAM(S): at 10:01

## 2020-02-12 RX ADMIN — HEPARIN SODIUM 5000 UNIT(S): 5000 INJECTION INTRAVENOUS; SUBCUTANEOUS at 17:42

## 2020-02-12 RX ADMIN — FLUDROCORTISONE ACETATE 0.1 MILLIGRAM(S): 0.1 TABLET ORAL at 05:28

## 2020-02-12 RX ADMIN — TIOTROPIUM BROMIDE 1 CAPSULE(S): 18 CAPSULE ORAL; RESPIRATORY (INHALATION) at 08:41

## 2020-02-12 RX ADMIN — FINASTERIDE 5 MILLIGRAM(S): 5 TABLET, FILM COATED ORAL at 14:23

## 2020-02-12 RX ADMIN — BUDESONIDE AND FORMOTEROL FUMARATE DIHYDRATE 2 PUFF(S): 160; 4.5 AEROSOL RESPIRATORY (INHALATION) at 21:50

## 2020-02-12 RX ADMIN — Medication 3 MILLILITER(S): at 15:28

## 2020-02-12 RX ADMIN — Medication 400 MILLIGRAM(S): at 21:17

## 2020-02-12 RX ADMIN — Medication 3 MILLILITER(S): at 21:48

## 2020-02-12 RX ADMIN — Medication 500 MILLIGRAM(S): at 17:42

## 2020-02-12 RX ADMIN — Medication 250 MILLIGRAM(S): at 17:42

## 2020-02-12 RX ADMIN — PREGABALIN 1000 MICROGRAM(S): 225 CAPSULE ORAL at 14:23

## 2020-02-12 RX ADMIN — GABAPENTIN 300 MILLIGRAM(S): 400 CAPSULE ORAL at 14:23

## 2020-02-12 RX ADMIN — Medication 3 MILLILITER(S): at 08:41

## 2020-02-12 NOTE — PHYSICAL THERAPY INITIAL EVALUATION ADULT - ADDITIONAL COMMENTS
Pt lives in an apartment with wife, no steps.  Pt is usually independent with all ambulation and transfers, uses no device, has home O2.

## 2020-02-12 NOTE — CONSULT NOTE ADULT - ASSESSMENT
74 year old man admitted with weakness, shortness of breath... found to have pneumonia.  He has been complaining of chest pain that is exacerbated by coughing, deep breath or change in position.    No acute changes on EKG and cardiac troponin  negative  The symptoms have mostly atypical features.  He has severe COPD that is oxygen-dependent.    Plan  - check echo  - eventual ischemia evaluation... should be done post resolution of pneumonia and acute respiratory illness  - ? consider holding florinef in setting of elevated BP    discussed with patient and with Dr. Cabrera

## 2020-02-12 NOTE — PROGRESS NOTE ADULT - PROBLEM SELECTOR PLAN 3
Pulm consulted  CTangio with no specific concern for neoplastic disease .  bilat pneumonia noted  Hemoptysis has resolved

## 2020-02-12 NOTE — PROGRESS NOTE ADULT - SUBJECTIVE AND OBJECTIVE BOX
Follow-up Pulm Progress Note    Jose Luis Gallego MD  [phone]    No new respiratory events overnight.  Denies SOB/CP. Less cough.  No hemoptysis     Medications:  Vital Signs Last 24 Hrs  T(C): 36.6 (12 Feb 2020 05:24), Max: 37.1 (11 Feb 2020 16:03)  T(F): 97.9 (12 Feb 2020 05:24), Max: 98.8 (11 Feb 2020 16:03)  HR: 64 (12 Feb 2020 08:57) (59 - 79)  BP: 150/76 (12 Feb 2020 05:24) (121/71 - 158/80)  BP(mean): 108 (11 Feb 2020 16:03) (108 - 108)  RR: 16 (12 Feb 2020 05:24) (16 - 18)  SpO2: 94% (12 Feb 2020 08:57) (94% - 98%)            LABS:                        12.6   6.34  )-----------( 157      ( 12 Feb 2020 06:45 )             37.5     02-12    146<H>  |  108  |  13  ----------------------------<  101<H>  3.6   |  30  |  0.90    Ca    9.1      12 Feb 2020 06:45                CULTURES:  Culture Results:   Rare Pseudomonas aeruginosa  Normal Respiratory Nia present (02-08 @ 13:15)  Culture Results:   <10,000 CFU/mL Normal Urogenital Nia (02-07 @ 22:20)  Culture Results:   No growth to date. (02-07 @ 15:15)  Culture Results:   No growth to date. (02-07 @ 15:15)    Most recent blood culture -- 02-08 @ 13:15   Pseudomonas aeruginosa Pseudomonas aeruginosa .Sputum Sputum 02-08 @ 13:15  Most recent blood culture -- 02-07 @ 22:20   -- -- .Urine Clean Catch (Midstream) 02-07 @ 22:20  Most recent blood culture -- 02-07 @ 15:15   -- -- .Blood Blood-Peripheral 02-07 @ 15:15      Physical Examination:  PULM: distant bs bilat, less rhonchi  CVS: Regular rate and rhythm, no murmurs, rubs, or gallops  ABD: Soft, non-tender  EXT:  No clubbing, cyanosis, or edema    RADIOLOGY REVIEWED  CXR:    CT chest:    TTE:

## 2020-02-12 NOTE — PROGRESS NOTE ADULT - PROBLEM SELECTOR PLAN 6
Patient continues with pleuritic chest pain  Relieved with tylenolol  Cardiology consulted  Repeat EKg ordered

## 2020-02-12 NOTE — PROGRESS NOTE ADULT - PROBLEM SELECTOR PLAN 1
Sputum sample came with pseudomonas   Antibiotics switched from cetriaxone/azithromycin to cefepime 2/10, switched from cefepime to cipro today is day 5/10 of antibiotics  Pulmonary consult appreciated   Plan to dc home 2/13 if respiratory status continues to improve on oral cipro  Will need follow up with outpatient appointment for [place]

## 2020-02-12 NOTE — PHYSICAL THERAPY INITIAL EVALUATION ADULT - CRITERIA FOR SKILLED THERAPEUTIC INTERVENTIONS
anticipated equipment needs at discharge/Pt independent with all ambulation and transfers.  No need for PT services/anticipated discharge recommendation

## 2020-02-12 NOTE — PROGRESS NOTE ADULT - ASSESSMENT
73yo male w. PMH COPD presents w. c/o SOB, generalized weakness found to have severe sepsis secondary to community acquired pneumonia.    Dispo: Plan to dc home 2/13, has home O2

## 2020-02-12 NOTE — PROGRESS NOTE ADULT - SUBJECTIVE AND OBJECTIVE BOX
Patient is a 74y old  Male who presents with a chief complaint of sob/weakness/fever (12 Feb 2020 10:30)      Patient seen and examined at bedside.  Patient reports that breathing continues to feel easier.  Reports left sided chest pain with cough.      ALLERGIES:  gold containing compounds (Unknown)  Onions (Unknown)    MEDICATIONS:  acetaminophen   Tablet .. 650 milliGRAM(s) Oral every 6 hours PRN  albuterol/ipratropium for Nebulization 3 milliLiter(s) Nebulizer every 6 hours  atorvastatin 20 milliGRAM(s) Oral at bedtime  budesonide 160 MICROgram(s)/formoterol 4.5 MICROgram(s) Inhaler 2 Puff(s) Inhalation two times a day  cholecalciferol 1000 Unit(s) Oral daily  ciprofloxacin     Tablet 500 milliGRAM(s) Oral every 12 hours  cyanocobalamin 1000 MICROGram(s) Oral daily  finasteride 5 milliGRAM(s) Oral daily  fludroCORTISONE 0.1 milliGRAM(s) Oral daily  gabapentin 300 milliGRAM(s) Oral daily  heparin  Injectable 5000 Unit(s) SubCutaneous every 12 hours  lisinopril 5 milliGRAM(s) Oral daily  mesalamine DR Capsule 400 milliGRAM(s) Oral three times a day  saccharomyces boulardii 250 milliGRAM(s) Oral two times a day  tamsulosin 0.4 milliGRAM(s) Oral at bedtime  tiotropium 18 MICROgram(s) Capsule 1 Capsule(s) Inhalation daily    Vital Signs Last 24 Hrs  T(F): 97.9 (12 Feb 2020 05:24), Max: 98.8 (11 Feb 2020 16:03)  HR: 64 (12 Feb 2020 08:57) (59 - 79)  BP: 150/76 (12 Feb 2020 05:24) (121/71 - 158/80)  RR: 16 (12 Feb 2020 05:24) (16 - 18)  SpO2: 94% (12 Feb 2020 08:57) (94% - 98%)  I&O's Summary      PHYSICAL EXAM:  General: NAD, A/O x 3  ENT: MMM  Neck: Supple, No JVD  Lungs: improved air entry but continues to be diminshed, no crackle and no wheeze currently   Cardio: RRR, S1/S2, No murmurs  Abdomen: Soft, Nontender, Nondistended; Bowel sounds present  Extremities: No cyanosis, No edema    LABS:                        12.6   6.34  )-----------( 157      ( 12 Feb 2020 06:45 )             37.5     02-12    146  |  108  |  13  ----------------------------<  101  3.6   |  30  |  0.90    Ca    9.1      12 Feb 2020 06:45      eGFR if Non African American: 83 mL/min/1.73M2 (02-12-20 @ 06:45)  eGFR if : 96 mL/min/1.73M2 (02-12-20 @ 06:45)        CARDIAC MARKERS ( 09 Feb 2020 19:15 )  <.017 ng/mL / x     / x     / x     / x      CARDIAC MARKERS ( 09 Feb 2020 14:25 )  <.017 ng/mL / x     / x     / x     / x                                Culture - Sputum (collected 08 Feb 2020 13:15)  Source: .Sputum Sputum  Gram Stain (08 Feb 2020 22:46):    Rare polymorphonuclear leukocytes per low power field    No Squamous epithelial cells per low power field    Rare Gram Variable Rods per oil power field  Final Report (10 Feb 2020 20:23):    Rare Pseudomonas aeruginosa    Normal Respiratory Nia present  Organism: Pseudomonas aeruginosa (10 Feb 2020 20:23)  Organism: Pseudomonas aeruginosa (10 Feb 2020 20:23)      -  Amikacin: S <=16      -  Aztreonam: S <=4      -  Cefepime: S <=2      -  Ceftazidime: S 4      -  Ciprofloxacin: S <=1      -  Gentamicin: S 4      -  Imipenem: S <=1      -  Levofloxacin: S <=2      -  Meropenem: S 2      -  Piperacillin/Tazobactam: S <=8      -  Tobramycin: S <=2      Method Type: BRAEDEN    Culture - Urine (collected 07 Feb 2020 22:20)  Source: .Urine Clean Catch (Midstream)  Final Report (09 Feb 2020 10:26):    <10,000 CFU/mL Normal Urogenital Nia    Culture - Blood (collected 07 Feb 2020 15:15)  Source: .Blood Blood-Peripheral  Preliminary Report (08 Feb 2020 22:01):    No growth to date.    Culture - Blood (collected 07 Feb 2020 15:15)  Source: .Blood Blood-Peripheral  Preliminary Report (08 Feb 2020 22:01):    No growth to date.        RADIOLOGY & ADDITIONAL TESTS:  < from: CT Angio Chest w/ IV Cont (02.10.20 @ 15:16) >  Impression:    The thoracic aorta demonstrates no evidence for dissection or aneurysm. Chronic infrarenal type B abdominal aortic dissection, unchanged from previous abdominal CT dated 4/6/2012.     No suspicious filling defect in the pulmonary arteries to suggest pulmonary embolism.    Mild bilateral pleural effusions, left greater than right.     Mildly enlarged 1.1 cm right hilarlymph node.    New patchy consolidations in both lungs, left greater than right. Small density in the left mainstem bronchus may represent mucous material. Follow chest CT in 4-6 weeks may be pursued to ensure resolution.    COPD.    < end of copied text >    Care Discussed with Consultants/Other Providers:

## 2020-02-12 NOTE — CONSULT NOTE ADULT - SUBJECTIVE AND OBJECTIVE BOX
[facility] Consultation    CHIEF COMPLAINT: Patient is a 74y old  Male who presents with a chief complaint of sob/weakness/fever (12 Feb 2020 10:53)  patient is examined and chart reviewed  history from patient... good reliability      HPI:  The patient is a 74 year old man admitted Feb 7, 2020 with complaints of fatigue, weakness and shortness of breath.  Diagnosed with pneumonia and placed on IV antibiotics and O2.  For the past 3 days, he has been experiencing chest pain... left sided, 3/10 intensity, non radiating and not associated with diaphoresis.  Constant chest pain, relieved post taking Tylenol.  Chest pain is exacerbated by taking a deep breath, coughing or changing position.  No palpitations, dizziness, lightheadedness, syncope or near syncope.  No edema, no orthopnea.      PAST MEDICAL & SURGICAL HISTORY  He denies any previous cardiac history.  No CAD, MI, CHF, arrhythmia or heart murmur.  He had cardiac testing done several years ago including stress test, which he states was favorable.  Remote history of "pleurisy"... current chest pain reminds him of that   orthostasis... on florinef   High cholesterol  COPD (chronic obstructive pulmonary disease)  H/O eye surgery  History of cholecystectomy      SOCIAL HISTORY: former smoker, quit 20 years ago.  No alcohol.  Retired, financial services.      FAMILY HISTORY  No pertinent family history in first degree relatives      MEDICATIONS  (STANDING):  albuterol/ipratropium for Nebulization 3 milliLiter(s) Nebulizer every 6 hours  atorvastatin 20 milliGRAM(s) Oral at bedtime  budesonide 160 MICROgram(s)/formoterol 4.5 MICROgram(s) Inhaler 2 Puff(s) Inhalation two times a day  cholecalciferol 1000 Unit(s) Oral daily  ciprofloxacin     Tablet 500 milliGRAM(s) Oral every 12 hours  cyanocobalamin 1000 MICROGram(s) Oral daily  finasteride 5 milliGRAM(s) Oral daily  fludroCORTISONE 0.1 milliGRAM(s) Oral daily  gabapentin 300 milliGRAM(s) Oral daily  heparin  Injectable 5000 Unit(s) SubCutaneous every 12 hours  lisinopril 5 milliGRAM(s) Oral daily  mesalamine DR Capsule 400 milliGRAM(s) Oral three times a day  saccharomyces boulardii 250 milliGRAM(s) Oral two times a day  tamsulosin 0.4 milliGRAM(s) Oral at bedtime  tiotropium 18 MICROgram(s) Capsule 1 Capsule(s) Inhalation daily    MEDICATIONS  (PRN):  acetaminophen   Tablet .. 650 milliGRAM(s) Oral every 6 hours PRN Temp greater or equal to 38C (100.4F), Mild Pain (1 - 3), Moderate Pain (4 - 6)      Allergies    gold containing compounds (Unknown)  Onions (Unknown)    Intolerances        REVIEW OF SYSTEMS:    CONSTITUTIONAL: weakness   EYES: No visual changes, No diplopia  ENMT: No throat pain , No exudate  NECK: No pain or stiffness  CARDIOVASCULAR: No chest pain or chest pressure.   No palpitations, dizziness, light headedness, syncope or near syncope.  No edema, no orthopnea.   GASTROINTESTINAL: No abdominal pain. No nausea, vomiting, or hematemesis; No diarrhea or constipation. No melena or hematochezia.  GENITOURINARY: No dysuria, frequency or hematuria  NEUROLOGICAL: No numbness or weakness  SKIN: No itching or rash  All other review of systems is negative unless indicated above    VITAL SIGNS:   Vital Signs Last 24 Hrs  T(C): 36.6 (12 Feb 2020 05:24), Max: 37.1 (11 Feb 2020 16:03)  T(F): 97.9 (12 Feb 2020 05:24), Max: 98.8 (11 Feb 2020 16:03)  HR: 64 (12 Feb 2020 08:57) (59 - 79)  BP: 150/76 (12 Feb 2020 05:24) (121/71 - 158/80)  BP(mean): 108 (11 Feb 2020 16:03) (108 - 108)  RR: 16 (12 Feb 2020 05:24) (16 - 18)  SpO2: 94% (12 Feb 2020 08:57) (94% - 98%)    I&O's Summary    12 Feb 2020 07:01  -  12 Feb 2020 14:17  --------------------------------------------------------  IN: 1600 mL / OUT: 0 mL / NET: 1600 mL        PHYSICAL EXAM:    Constitutional: NAD, awake and alert, well-developed  Eyes:  EOMI,  Pupils round, no lesions  ENMT: no exudate or erythema  Pulmonary: decreased breath sounds bilaterally   Cardiovascular: PMI  non-displaced Regular S1 and S2   Gastrointestinal: Bowel Sounds present, soft, nontender.   Lymph: No peripheral edema. No cervical lymphadenopathy.  Neurological: Alert, no focal deficits  Skin: No rashes. Changes of chronic venous stasis. No cyanosis.  Psych:  Mood & affect appropriate    LABS: All Labs Reviewed:                        12.6   6.34  )-----------( 157      ( 12 Feb 2020 06:45 )             37.5                         13.0   6.36  )-----------( 156      ( 11 Feb 2020 05:44 )             39.4                         12.2   8.29  )-----------( 143      ( 10 Feb 2020 07:34 )             37.1     12 Feb 2020 06:45    146    |  108    |  13     ----------------------------<  101    3.6     |  30     |  0.90   11 Feb 2020 05:44    145    |  108    |  16     ----------------------------<  108    3.7     |  34     |  0.93   10 Feb 2020 07:34    145    |  107    |  16     ----------------------------<  88     3.9     |  31     |  0.88     Ca    9.1        12 Feb 2020 06:45  Ca    8.8        11 Feb 2020 05:44  Ca    8.3        10 Feb 2020 07:34      RADIOLOGY:  < from: Xray Chest 1 View-PORTABLE IMMEDIATE (02.12.20 @ 12:16) >  IMPRESSION: Persistent left base infiltrate. Severe COPD again noted.    < end of copied text >    EKG:  < from: 12 Lead ECG (02.12.20 @ 11:10) >  Normal sinus rhythm  Normal ECG  When compared with ECG of 09-FEB-2020 14:30,  No significant change was found    < end of copied text >

## 2020-02-13 DIAGNOSIS — R07.9 CHEST PAIN, UNSPECIFIED: ICD-10-CM

## 2020-02-13 LAB
ANION GAP SERPL CALC-SCNC: 7 MMOL/L — SIGNIFICANT CHANGE UP (ref 5–17)
BASOPHILS # BLD AUTO: 0.03 K/UL — SIGNIFICANT CHANGE UP (ref 0–0.2)
BASOPHILS NFR BLD AUTO: 0.5 % — SIGNIFICANT CHANGE UP (ref 0–2)
BUN SERPL-MCNC: 19 MG/DL — SIGNIFICANT CHANGE UP (ref 7–23)
CALCIUM SERPL-MCNC: 8.6 MG/DL — SIGNIFICANT CHANGE UP (ref 8.4–10.5)
CHLORIDE SERPL-SCNC: 109 MMOL/L — HIGH (ref 96–108)
CO2 SERPL-SCNC: 32 MMOL/L — HIGH (ref 22–31)
CREAT SERPL-MCNC: 0.96 MG/DL — SIGNIFICANT CHANGE UP (ref 0.5–1.3)
EOSINOPHIL # BLD AUTO: 0.38 K/UL — SIGNIFICANT CHANGE UP (ref 0–0.5)
EOSINOPHIL NFR BLD AUTO: 5.7 % — SIGNIFICANT CHANGE UP (ref 0–6)
GLUCOSE SERPL-MCNC: 98 MG/DL — SIGNIFICANT CHANGE UP (ref 70–99)
HCT VFR BLD CALC: 36.2 % — LOW (ref 39–50)
HGB BLD-MCNC: 11.9 G/DL — LOW (ref 13–17)
IMM GRANULOCYTES NFR BLD AUTO: 0.6 % — SIGNIFICANT CHANGE UP (ref 0–1.5)
LYMPHOCYTES # BLD AUTO: 1.4 K/UL — SIGNIFICANT CHANGE UP (ref 1–3.3)
LYMPHOCYTES # BLD AUTO: 21.1 % — SIGNIFICANT CHANGE UP (ref 13–44)
MCHC RBC-ENTMCNC: 31.2 PG — SIGNIFICANT CHANGE UP (ref 27–34)
MCHC RBC-ENTMCNC: 32.9 GM/DL — SIGNIFICANT CHANGE UP (ref 32–36)
MCV RBC AUTO: 95 FL — SIGNIFICANT CHANGE UP (ref 80–100)
MONOCYTES # BLD AUTO: 0.63 K/UL — SIGNIFICANT CHANGE UP (ref 0–0.9)
MONOCYTES NFR BLD AUTO: 9.5 % — SIGNIFICANT CHANGE UP (ref 2–14)
NEUTROPHILS # BLD AUTO: 4.14 K/UL — SIGNIFICANT CHANGE UP (ref 1.8–7.4)
NEUTROPHILS NFR BLD AUTO: 62.6 % — SIGNIFICANT CHANGE UP (ref 43–77)
NRBC # BLD: 0 /100 WBCS — SIGNIFICANT CHANGE UP (ref 0–0)
PLATELET # BLD AUTO: 174 K/UL — SIGNIFICANT CHANGE UP (ref 150–400)
POTASSIUM SERPL-MCNC: 3.4 MMOL/L — LOW (ref 3.5–5.3)
POTASSIUM SERPL-SCNC: 3.4 MMOL/L — LOW (ref 3.5–5.3)
RBC # BLD: 3.81 M/UL — LOW (ref 4.2–5.8)
RBC # FLD: 13.3 % — SIGNIFICANT CHANGE UP (ref 10.3–14.5)
SODIUM SERPL-SCNC: 148 MMOL/L — HIGH (ref 135–145)
WBC # BLD: 6.62 K/UL — SIGNIFICANT CHANGE UP (ref 3.8–10.5)
WBC # FLD AUTO: 6.62 K/UL — SIGNIFICANT CHANGE UP (ref 3.8–10.5)

## 2020-02-13 PROCEDURE — 99232 SBSQ HOSP IP/OBS MODERATE 35: CPT | Mod: GC

## 2020-02-13 PROCEDURE — 99232 SBSQ HOSP IP/OBS MODERATE 35: CPT

## 2020-02-13 PROCEDURE — 99233 SBSQ HOSP IP/OBS HIGH 50: CPT

## 2020-02-13 RX ORDER — POTASSIUM CHLORIDE 20 MEQ
40 PACKET (EA) ORAL ONCE
Refills: 0 | Status: COMPLETED | OUTPATIENT
Start: 2020-02-13 | End: 2020-02-13

## 2020-02-13 RX ADMIN — Medication 3 MILLILITER(S): at 15:04

## 2020-02-13 RX ADMIN — TIOTROPIUM BROMIDE 1 CAPSULE(S): 18 CAPSULE ORAL; RESPIRATORY (INHALATION) at 08:27

## 2020-02-13 RX ADMIN — Medication 250 MILLIGRAM(S): at 17:31

## 2020-02-13 RX ADMIN — TAMSULOSIN HYDROCHLORIDE 0.4 MILLIGRAM(S): 0.4 CAPSULE ORAL at 21:35

## 2020-02-13 RX ADMIN — ATORVASTATIN CALCIUM 20 MILLIGRAM(S): 80 TABLET, FILM COATED ORAL at 21:35

## 2020-02-13 RX ADMIN — Medication 3 MILLILITER(S): at 21:20

## 2020-02-13 RX ADMIN — HEPARIN SODIUM 5000 UNIT(S): 5000 INJECTION INTRAVENOUS; SUBCUTANEOUS at 05:10

## 2020-02-13 RX ADMIN — GABAPENTIN 300 MILLIGRAM(S): 400 CAPSULE ORAL at 12:14

## 2020-02-13 RX ADMIN — PREGABALIN 1000 MICROGRAM(S): 225 CAPSULE ORAL at 12:14

## 2020-02-13 RX ADMIN — HEPARIN SODIUM 5000 UNIT(S): 5000 INJECTION INTRAVENOUS; SUBCUTANEOUS at 17:32

## 2020-02-13 RX ADMIN — BUDESONIDE AND FORMOTEROL FUMARATE DIHYDRATE 2 PUFF(S): 160; 4.5 AEROSOL RESPIRATORY (INHALATION) at 21:20

## 2020-02-13 RX ADMIN — Medication 3 MILLILITER(S): at 08:27

## 2020-02-13 RX ADMIN — Medication 400 MILLIGRAM(S): at 05:10

## 2020-02-13 RX ADMIN — Medication 500 MILLIGRAM(S): at 05:10

## 2020-02-13 RX ADMIN — Medication 250 MILLIGRAM(S): at 05:10

## 2020-02-13 RX ADMIN — LISINOPRIL 5 MILLIGRAM(S): 2.5 TABLET ORAL at 05:10

## 2020-02-13 RX ADMIN — BUDESONIDE AND FORMOTEROL FUMARATE DIHYDRATE 2 PUFF(S): 160; 4.5 AEROSOL RESPIRATORY (INHALATION) at 08:27

## 2020-02-13 RX ADMIN — Medication 500 MILLIGRAM(S): at 17:31

## 2020-02-13 RX ADMIN — FINASTERIDE 5 MILLIGRAM(S): 5 TABLET, FILM COATED ORAL at 12:15

## 2020-02-13 RX ADMIN — Medication 40 MILLIEQUIVALENT(S): at 12:19

## 2020-02-13 RX ADMIN — Medication 1000 UNIT(S): at 12:14

## 2020-02-13 NOTE — PROGRESS NOTE ADULT - SUBJECTIVE AND OBJECTIVE BOX
HPI  Pt is a 75yo M w hx of COPD on home O2 found to have bilateral pna.   Pt was seen and examined at bedside. Pt states he is feeling better today, however not ready to go home today. O2 on ambulation 84%, return to mid 90s after 2 L nasal canula    Vital Signs Last 24 Hrs  T(C): 36.6 (13 Feb 2020 04:32), Max: 36.7 (12 Feb 2020 15:27)  T(F): 97.8 (13 Feb 2020 04:32), Max: 98 (12 Feb 2020 15:27)  HR: 67 (13 Feb 2020 10:43) (66 - 71)  BP: 159/80 (13 Feb 2020 04:32) (142/72 - 159/80)  BP(mean): --  RR: 15 (13 Feb 2020 04:32) (15 - 16)  SpO2: 95% (13 Feb 2020 10:43) (93% - 97%)    I&O's Summary    12 Feb 2020 07:01  -  13 Feb 2020 07:00  --------------------------------------------------------  IN: 2140 mL / OUT: 0 mL / NET: 2140 mL    13 Feb 2020 07:01  -  13 Feb 2020 10:51  --------------------------------------------------------  IN: 700 mL / OUT: 0 mL / NET: 700 mL        CAPILLARY BLOOD GLUCOSE          PHYSICAL EXAM:    Constitutional: NAD,     Respiratory: minimal diminished bilateral lower breath sound, No wheezing, rales or rhonchi  Cardiovascular: S1 and S2,   Gastrointestinal: Bowel Sounds present, soft, nontender, nondistended, no guarding, no rebound  Extremities: No peripheral edema  Vascular: 2+ peripheral pulses  Neurological: A/O x 3, no focal deficits  Musculoskeletal: 5/5 strength b/l upper and lower extremities  Skin: No rashes    MEDICATIONS:  MEDICATIONS  (STANDING):  albuterol/ipratropium for Nebulization 3 milliLiter(s) Nebulizer every 6 hours  atorvastatin 20 milliGRAM(s) Oral at bedtime  budesonide 160 MICROgram(s)/formoterol 4.5 MICROgram(s) Inhaler 2 Puff(s) Inhalation two times a day  cholecalciferol 1000 Unit(s) Oral daily  ciprofloxacin     Tablet 500 milliGRAM(s) Oral every 12 hours  cyanocobalamin 1000 MICROGram(s) Oral daily  finasteride 5 milliGRAM(s) Oral daily  gabapentin 300 milliGRAM(s) Oral daily  heparin  Injectable 5000 Unit(s) SubCutaneous every 12 hours  lisinopril 5 milliGRAM(s) Oral daily  mesalamine DR Capsule 400 milliGRAM(s) Oral three times a day  saccharomyces boulardii 250 milliGRAM(s) Oral two times a day  tamsulosin 0.4 milliGRAM(s) Oral at bedtime  tiotropium 18 MICROgram(s) Capsule 1 Capsule(s) Inhalation daily      LABS: All Labs Reviewed:                        11.9   6.62  )-----------( 174      ( 13 Feb 2020 05:45 )             36.2     02-13    148<H>  |  109<H>  |  19  ----------------------------<  98  3.4<L>   |  32<H>  |  0.96    Ca    8.6      13 Feb 2020 05:45            Blood Culture: 02-08 @ 13:15  Organism Pseudomonas aeruginosa  Gram Stain Blood -- Gram Stain   Rare polymorphonuclear leukocytes per low power field  No Squamous epithelial cells per low power field  Rare Gram Variable Rods per oil power field  Specimen Source .Sputum Sputum  Culture-Blood --        RADIOLOGY/EKG:    DVT PPX:    ADVANCED DIRECTIVE:    DISPOSITION:

## 2020-02-13 NOTE — PROGRESS NOTE ADULT - ASSESSMENT
Pt is a 73yo M w hx of COPD on home O2 found to have bilateral pna.     *bilateral lower lobe pna secondary to psudomonas  s/p cefipime  day 6/10 cipro  pul f/u appreciated   Ambulation O2 84%, 2L oxygen 95% at rest     *COPD exacerbation w hemoptysis   cont neb  on abx   pul consult appreciated   CTA neg for CA     *Chest pain   Echo EF 50-55% w Grade I diastolic dysfunction   cardiology consult appreciated  follow up out pt for ischemic work up     *Hypertension   Florinef DCed   Cont lisinopril 5mg      *thrombocytopenia  resolved    *DVT ppx   Heparin SQ

## 2020-02-13 NOTE — PROGRESS NOTE ADULT - SUBJECTIVE AND OBJECTIVE BOX
Follow-up Pulm Progress Note    Jose Luis Gallego MD  [phone]    No new respiratory events overnight.  Denies SOB/CP. Feeling stronger.  Denies cp now.    Medications:  Vital Signs Last 24 Hrs  T(C): 36.6 (13 Feb 2020 04:32), Max: 36.6 (12 Feb 2020 21:16)  T(F): 97.8 (13 Feb 2020 04:32), Max: 97.9 (12 Feb 2020 21:16)  HR: 69 (13 Feb 2020 15:39) (67 - 71)  BP: 159/80 (13 Feb 2020 04:32) (142/72 - 159/80)  BP(mean): --  RR: 15 (13 Feb 2020 04:32) (15 - 16)  SpO2: 95% (13 Feb 2020 15:39) (95% - 97%)          02-12 @ 07:01  -  02-13 @ 07:00  --------------------------------------------------------  IN: 2140 mL / OUT: 0 mL / NET: 2140 mL        LABS:                        11.9   6.62  )-----------( 174      ( 13 Feb 2020 05:45 )             36.2     02-13    148<H>  |  109<H>  |  19  ----------------------------<  98  3.4<L>   |  32<H>  |  0.96    Ca    8.6      13 Feb 2020 05:45                CULTURES:  Culture Results:   Rare Pseudomonas aeruginosa  Normal Respiratory Nia present (02-08 @ 13:15)  Culture Results:   <10,000 CFU/mL Normal Urogenital Nia (02-07 @ 22:20)  Culture Results:   No growth at 5 days. (02-07 @ 15:15)  Culture Results:   No growth at 5 days. (02-07 @ 15:15)        Physical Examination:  PULM: Clear to auscultation bilaterally, no significant sputum production  CVS: Regular rate and rhythm, no murmurs, rubs, or gallops  ABD: Soft, non-tender  EXT:  No clubbing, cyanosis, or edema

## 2020-02-13 NOTE — PROGRESS NOTE ADULT - SUBJECTIVE AND OBJECTIVE BOX
Follow up for  SUBJ:  clearly pleuritic like chest pains    PMH  High cholesterol  COPD (chronic obstructive pulmonary disease)      MEDICATIONS  (STANDING):  albuterol/ipratropium for Nebulization 3 milliLiter(s) Nebulizer every 6 hours  atorvastatin 20 milliGRAM(s) Oral at bedtime  budesonide 160 MICROgram(s)/formoterol 4.5 MICROgram(s) Inhaler 2 Puff(s) Inhalation two times a day  cholecalciferol 1000 Unit(s) Oral daily  ciprofloxacin     Tablet 500 milliGRAM(s) Oral every 12 hours  cyanocobalamin 1000 MICROGram(s) Oral daily  finasteride 5 milliGRAM(s) Oral daily  gabapentin 300 milliGRAM(s) Oral daily  heparin  Injectable 5000 Unit(s) SubCutaneous every 12 hours  lisinopril 5 milliGRAM(s) Oral daily  mesalamine DR Capsule 400 milliGRAM(s) Oral three times a day  saccharomyces boulardii 250 milliGRAM(s) Oral two times a day  tamsulosin 0.4 milliGRAM(s) Oral at bedtime  tiotropium 18 MICROgram(s) Capsule 1 Capsule(s) Inhalation daily    MEDICATIONS  (PRN):  acetaminophen   Tablet .. 650 milliGRAM(s) Oral every 6 hours PRN Temp greater or equal to 38C (100.4F), Mild Pain (1 - 3), Moderate Pain (4 - 6)        PHYSICAL EXAM:  Vital Signs Last 24 Hrs  T(C): 36.6 (13 Feb 2020 04:32), Max: 36.7 (12 Feb 2020 15:27)  T(F): 97.8 (13 Feb 2020 04:32), Max: 98 (12 Feb 2020 15:27)  HR: 67 (13 Feb 2020 10:43) (66 - 71)  BP: 159/80 (13 Feb 2020 04:32) (142/72 - 159/80)  BP(mean): --  RR: 15 (13 Feb 2020 04:32) (15 - 16)  SpO2: 95% (13 Feb 2020 10:43) (93% - 97%)    GENERAL: NAD, well-groomed, well-developed  HEAD:  Atraumatic, Normocephalic  EYES: EOMI, PERRLA, conjunctiva and sclera clear  ENT: Moist mucous membranes,  NECK: Supple, No JVD, no bruits  CHEST/LUNG: Clear to percussion bilaterally; No rales, rhonchi, wheezing, or rubs  HEART: Regular rate and rhythm; No murmurs, rubs, or gallops PMI non displaced.  ABDOMEN: Soft, Nontender, Nondistended; Bowel sounds present  EXTREMITIES:  2+ Peripheral Pulses, No clubbing, cyanosis, or edema  SKIN: No rashes or lesions  NERVOUS SYSTEM:  Cranial Nerves II-XII intact      TELEMETRY:    n/a    ECG:    < from: 12 Lead ECG (02.12.20 @ 11:10) >  Diagnosis Line Normal sinus rhythm  Normal ECG  When compared with ECG of 09-FEB-2020 14:30,  No significant change was found  Confirmed by MICHAEL NOWAK, SHER FORREST ([id number]) on 2/12/2020 1:10:53 PM    < end of copied text >    ECHO:    < from: TTE Echo Complete w/Doppler (02.12.20 @ 14:30) >    Summary:   1. Left ventricular ejection fraction, by visual estimation, is 50 to 55%.   2. Technically difficult study.   3. Normal global left ventricular systolic function.   4. Normal left ventricular size and wall thicknesses, with normal systolic and diastolic function.   5. Spectral Doppler shows impaired relaxation pattern of left ventricular myocardial filling (Grade I diastolic dysfunction).   6. The left atrium is normal in size.   7. Normal right atrial size.   8. The right atrium is normal in size.   9. There is no evidence of pericardial effusion.  10. Thickening and calcification of the anterior and posterior mitral valve leaflets.  11. Sclerotic aortic valve with normal opening.    [id number] Sher Rodriguez MD, [facility] , Electronically signed on 2/12/2020 at 3:41:03 PM       *** Final ***    SHER RODRIGUEZ M.D., ATTENDING CARDIOLOGIST  This document has been electronically signed. Feb 12 2020  2:30PM    < end of copied text >      LABS:                        11.9   6.62  )-----------( 174      ( 13 Feb 2020 05:45 )             36.2     02-13    148<H>  |  109<H>  |  19  ----------------------------<  98  3.4<L>   |  32<H>  |  0.96    Ca    8.6      13 Feb 2020 05:45    I&O's Summary    12 Feb 2020 07:01  -  13 Feb 2020 07:00  --------------------------------------------------------  IN: 2140 mL / OUT: 0 mL / NET: 2140 mL    13 Feb 2020 07:01  -  13 Feb 2020 14:40  --------------------------------------------------------  IN: 1600 mL / OUT: 0 mL / NET: 1600 mL      BNP    RADIOLOGY & ADDITIONAL STUDIES:    < from: Xray Chest 1 View-PORTABLE IMMEDIATE (02.12.20 @ 12:16) >  PRESSION: Persistent left base infiltrate. Severe COPD again noted.        SAYRA FIGUEROA M.D., ATTENDING RADIOLOGIST  This document has been electronically signed. Feb 12 2020 12:21PM    < end of copied text >      ECHO:

## 2020-02-13 NOTE — PROGRESS NOTE ADULT - ATTENDING COMMENTS
pleuritic chest pains  would continue to treat a pneumonic process. when stabilized would recommend outpatient cardiac cta rule out obstructive cad. a prior nuke was negative . pros and cons  of all approaches discussed including a sensitivity analysis.
I have personally seen and examined patient on the above date.  I discussed the case with JAMEL Thakur and I agree with findings and plan as detailed per note above, which I have amended where appropriate.    Pt with COPD p/w worsening sob admitted for CAP-- cont with iv rocephin/azithro. follow up cultures.  COPD : stable -- cont with nebs

## 2020-02-14 ENCOUNTER — TRANSCRIPTION ENCOUNTER (OUTPATIENT)
Age: 75
End: 2020-02-14

## 2020-02-14 VITALS — OXYGEN SATURATION: 98 %

## 2020-02-14 LAB
ANION GAP SERPL CALC-SCNC: 9 MMOL/L — SIGNIFICANT CHANGE UP (ref 5–17)
BUN SERPL-MCNC: 19 MG/DL — SIGNIFICANT CHANGE UP (ref 7–23)
CALCIUM SERPL-MCNC: 9.4 MG/DL — SIGNIFICANT CHANGE UP (ref 8.4–10.5)
CHLORIDE SERPL-SCNC: 108 MMOL/L — SIGNIFICANT CHANGE UP (ref 96–108)
CO2 SERPL-SCNC: 29 MMOL/L — SIGNIFICANT CHANGE UP (ref 22–31)
CREAT SERPL-MCNC: 0.95 MG/DL — SIGNIFICANT CHANGE UP (ref 0.5–1.3)
GLUCOSE SERPL-MCNC: 106 MG/DL — HIGH (ref 70–99)
HCT VFR BLD CALC: 38.5 % — LOW (ref 39–50)
HGB BLD-MCNC: 12.6 G/DL — LOW (ref 13–17)
MCHC RBC-ENTMCNC: 31.3 PG — SIGNIFICANT CHANGE UP (ref 27–34)
MCHC RBC-ENTMCNC: 32.7 GM/DL — SIGNIFICANT CHANGE UP (ref 32–36)
MCV RBC AUTO: 95.8 FL — SIGNIFICANT CHANGE UP (ref 80–100)
NRBC # BLD: 0 /100 WBCS — SIGNIFICANT CHANGE UP (ref 0–0)
PLATELET # BLD AUTO: 194 K/UL — SIGNIFICANT CHANGE UP (ref 150–400)
POTASSIUM SERPL-MCNC: 3.6 MMOL/L — SIGNIFICANT CHANGE UP (ref 3.5–5.3)
POTASSIUM SERPL-SCNC: 3.6 MMOL/L — SIGNIFICANT CHANGE UP (ref 3.5–5.3)
RBC # BLD: 4.02 M/UL — LOW (ref 4.2–5.8)
RBC # FLD: 13.4 % — SIGNIFICANT CHANGE UP (ref 10.3–14.5)
SODIUM SERPL-SCNC: 146 MMOL/L — HIGH (ref 135–145)
WBC # BLD: 5.23 K/UL — SIGNIFICANT CHANGE UP (ref 3.8–10.5)
WBC # FLD AUTO: 5.23 K/UL — SIGNIFICANT CHANGE UP (ref 3.8–10.5)

## 2020-02-14 PROCEDURE — 87070 CULTURE OTHR SPECIMN AEROBIC: CPT

## 2020-02-14 PROCEDURE — 80053 COMPREHEN METABOLIC PANEL: CPT

## 2020-02-14 PROCEDURE — 96365 THER/PROPH/DIAG IV INF INIT: CPT

## 2020-02-14 PROCEDURE — 83605 ASSAY OF LACTIC ACID: CPT

## 2020-02-14 PROCEDURE — 81001 URINALYSIS AUTO W/SCOPE: CPT

## 2020-02-14 PROCEDURE — 99239 HOSP IP/OBS DSCHRG MGMT >30: CPT

## 2020-02-14 PROCEDURE — 87186 SC STD MICRODIL/AGAR DIL: CPT

## 2020-02-14 PROCEDURE — 93005 ELECTROCARDIOGRAM TRACING: CPT

## 2020-02-14 PROCEDURE — 87449 NOS EACH ORGANISM AG IA: CPT

## 2020-02-14 PROCEDURE — 99285 EMERGENCY DEPT VISIT HI MDM: CPT | Mod: 25

## 2020-02-14 PROCEDURE — 87086 URINE CULTURE/COLONY COUNT: CPT

## 2020-02-14 PROCEDURE — 84484 ASSAY OF TROPONIN QUANT: CPT

## 2020-02-14 PROCEDURE — 87633 RESP VIRUS 12-25 TARGETS: CPT

## 2020-02-14 PROCEDURE — 82803 BLOOD GASES ANY COMBINATION: CPT

## 2020-02-14 PROCEDURE — 94640 AIRWAY INHALATION TREATMENT: CPT

## 2020-02-14 PROCEDURE — 87798 DETECT AGENT NOS DNA AMP: CPT

## 2020-02-14 PROCEDURE — 97161 PT EVAL LOW COMPLEX 20 MIN: CPT

## 2020-02-14 PROCEDURE — 71045 X-RAY EXAM CHEST 1 VIEW: CPT

## 2020-02-14 PROCEDURE — 87581 M.PNEUMON DNA AMP PROBE: CPT

## 2020-02-14 PROCEDURE — 87899 AGENT NOS ASSAY W/OPTIC: CPT

## 2020-02-14 PROCEDURE — 97116 GAIT TRAINING THERAPY: CPT

## 2020-02-14 PROCEDURE — 87486 CHLMYD PNEUM DNA AMP PROBE: CPT

## 2020-02-14 PROCEDURE — 80048 BASIC METABOLIC PNL TOTAL CA: CPT

## 2020-02-14 PROCEDURE — 87040 BLOOD CULTURE FOR BACTERIA: CPT

## 2020-02-14 PROCEDURE — 94760 N-INVAS EAR/PLS OXIMETRY 1: CPT

## 2020-02-14 PROCEDURE — 36415 COLL VENOUS BLD VENIPUNCTURE: CPT

## 2020-02-14 PROCEDURE — 85027 COMPLETE CBC AUTOMATED: CPT

## 2020-02-14 PROCEDURE — 87631 RESP VIRUS 3-5 TARGETS: CPT

## 2020-02-14 PROCEDURE — 71275 CT ANGIOGRAPHY CHEST: CPT

## 2020-02-14 RX ORDER — LISINOPRIL 2.5 MG/1
1 TABLET ORAL
Qty: 30 | Refills: 0
Start: 2020-02-14 | End: 2020-03-14

## 2020-02-14 RX ORDER — FLUDROCORTISONE ACETATE 0.1 MG/1
1 TABLET ORAL
Qty: 0 | Refills: 0 | DISCHARGE

## 2020-02-14 RX ORDER — CIPROFLOXACIN LACTATE 400MG/40ML
1 VIAL (ML) INTRAVENOUS
Qty: 6 | Refills: 0
Start: 2020-02-14 | End: 2020-02-16

## 2020-02-14 RX ADMIN — TIOTROPIUM BROMIDE 1 CAPSULE(S): 18 CAPSULE ORAL; RESPIRATORY (INHALATION) at 09:45

## 2020-02-14 RX ADMIN — Medication 3 MILLILITER(S): at 09:46

## 2020-02-14 RX ADMIN — BUDESONIDE AND FORMOTEROL FUMARATE DIHYDRATE 2 PUFF(S): 160; 4.5 AEROSOL RESPIRATORY (INHALATION) at 09:46

## 2020-02-14 RX ADMIN — PREGABALIN 1000 MICROGRAM(S): 225 CAPSULE ORAL at 11:12

## 2020-02-14 RX ADMIN — Medication 500 MILLIGRAM(S): at 06:05

## 2020-02-14 RX ADMIN — Medication 400 MILLIGRAM(S): at 06:05

## 2020-02-14 RX ADMIN — HEPARIN SODIUM 5000 UNIT(S): 5000 INJECTION INTRAVENOUS; SUBCUTANEOUS at 06:05

## 2020-02-14 RX ADMIN — Medication 250 MILLIGRAM(S): at 06:05

## 2020-02-14 RX ADMIN — LISINOPRIL 5 MILLIGRAM(S): 2.5 TABLET ORAL at 06:05

## 2020-02-14 RX ADMIN — Medication 1000 UNIT(S): at 11:12

## 2020-02-14 RX ADMIN — FINASTERIDE 5 MILLIGRAM(S): 5 TABLET, FILM COATED ORAL at 11:12

## 2020-02-14 NOTE — PROGRESS NOTE ADULT - REASON FOR ADMISSION
Pneumonia
sob/weakness/fever

## 2020-02-14 NOTE — DISCHARGE NOTE PROVIDER - NSDCCPCAREPLAN_GEN_ALL_CORE_FT
PRINCIPAL DISCHARGE DIAGNOSIS  Diagnosis: Pneumonia of lower lobe due to infectious organism, unspecified laterality  Assessment and Plan of Treatment:       SECONDARY DISCHARGE DIAGNOSES  Diagnosis: Hypoxia  Assessment and Plan of Treatment:

## 2020-02-14 NOTE — DISCHARGE NOTE PROVIDER - HOSPITAL COURSE
Pt is a 61yo M admitted w hx of COPD on home O2 found to have bilateral pna secondary to psudomonus. pt s/p Cefipime, and is currently on day 7/10 of cipro. Pt ambulation O2 84%, 2L oxgyn 95% at rest. Pt found to have chest pain with echo with EF 50-55% wigh Grade I diastolic dysfunction and will need to follow up with ischemic work up. Pt has hx of Hypotension and on Florinef, due to hypertension it was DCed and will start Lisinopril 5mg.

## 2020-02-14 NOTE — DISCHARGE NOTE NURSING/CASE MANAGEMENT/SOCIAL WORK - PATIENT PORTAL LINK FT
You can access the FollowMyHealth Patient Portal offered by [facility] by registering at the following website: [email or web address]. By joining [employer]’s FollowMyHealth portal, you will also be able to view your health information using other applications (apps) compatible with our system.

## 2020-02-14 NOTE — DISCHARGE NOTE PROVIDER - CARE PROVIDER_API CALL
José Manuel Harden (DO)  Internal Medicine  [address] [id number]  Phone: [phone]  Fax: [phone]  Follow Up Time:

## 2020-02-14 NOTE — PROGRESS NOTE ADULT - ASSESSMENT
Pt is a 75yo M w hx of COPD on home O2 found to have bilateral pna.     *bilateral lower lobe pna secondary to psudomonas  s/p cefipime  day 6/10 cipro  pul f/u appreciated   Ambulation O2 84%, 2L oxygen 95% at rest     *COPD exacerbation w hemoptysis   cont neb  on abx   pul consult appreciated   CTA neg for CA     *Chest pain   Echo EF 50-55% w Grade I diastolic dysfunction   cardiology consult appreciated  follow up out pt for ischemic work up     *Hypertension   Florinef DCed   Cont lisinopril 5mg      *thrombocytopenia  resolved    *DVT ppx   Heparin SQ

## 2020-02-14 NOTE — DISCHARGE NOTE PROVIDER - NSDCMRMEDTOKEN_GEN_ALL_CORE_FT
Advair Diskus 250 mcg-50 mcg inhalation powder: inhaled 2 times a day  Apriso 0.375 g oral capsule, extended release: 4 cap(s) orally once a day (in the morning)  atorvastatin 20 mg oral tablet: 1 tab(s) orally once a day  ciprofloxacin 500 mg oral tablet: 1 tab(s) orally every 12 hours  Flomax 0.4 mg oral capsule: 1 cap(s) orally once a day  Florastor 250 mg oral capsule: 1 cap(s) orally once a day  gabapentin 300 mg oral capsule: orally once a day  lisinopril 5 mg oral tablet: 1 tab(s) orally once a day  ProAir HFA 90 mcg/inh inhalation aerosol: 2 puff(s) inhaled every 6 hours, As Needed  Proscar 5 mg oral tablet: 1 tab(s) orally once a day  Spiriva 18 mcg inhalation capsule: 1 cap(s) inhaled once a day  Vitamin B-12: orally once a day  Vitamin D3: orally once a day

## 2020-02-14 NOTE — PROGRESS NOTE ADULT - SUBJECTIVE AND OBJECTIVE BOX
HPI  Pt is a 75yo M w hx of COPD on home O2 found to have bilateral pna.   Pt was seen and examined at bedside. Pt states he is feeling better today, however not ready to go home today. O2 on ambulation 84%, return to mid 90s after 2 L nasal canula    Vital Signs Last 24 Hrs  T(C): 36.5 (14 Feb 2020 05:53), Max: 36.6 (13 Feb 2020 16:31)  T(F): 97.7 (14 Feb 2020 05:53), Max: 97.9 (13 Feb 2020 16:31)  HR: 48 (14 Feb 2020 09:48) (48 - 77)  BP: 162/86 (14 Feb 2020 05:53) (138/73 - 162/86)  BP(mean): 97 (13 Feb 2020 16:31) (97 - 97)  RR: 16 (14 Feb 2020 05:53) (16 - 18)  SpO2: 98% (14 Feb 2020 09:48) (92% - 100%)    I&O's Summary    13 Feb 2020 07:01  -  14 Feb 2020 07:00  --------------------------------------------------------  IN: 1600 mL / OUT: 0 mL / NET: 1600 mL        CAPILLARY BLOOD GLUCOSE          PHYSICAL EXAM:    Constitutional: NAD,     Respiratory: minimal diminished bilateral lower breath sound, No wheezing, rales or rhonchi  Cardiovascular: S1 and S2,   Gastrointestinal: Bowel Sounds present, soft, nontender, nondistended, no guarding, no rebound  Extremities: No peripheral edema  Vascular: 2+ peripheral pulses  Neurological: A/O x 3, no focal deficits  Musculoskeletal: 5/5 strength b/l upper and lower extremities  Skin: No rashes    MEDICATIONS:  MEDICATIONS  (STANDING):  albuterol/ipratropium for Nebulization 3 milliLiter(s) Nebulizer every 6 hours  atorvastatin 20 milliGRAM(s) Oral at bedtime  budesonide 160 MICROgram(s)/formoterol 4.5 MICROgram(s) Inhaler 2 Puff(s) Inhalation two times a day  cholecalciferol 1000 Unit(s) Oral daily  ciprofloxacin     Tablet 500 milliGRAM(s) Oral every 12 hours  cyanocobalamin 1000 MICROGram(s) Oral daily  finasteride 5 milliGRAM(s) Oral daily  gabapentin 300 milliGRAM(s) Oral daily  heparin  Injectable 5000 Unit(s) SubCutaneous every 12 hours  lisinopril 5 milliGRAM(s) Oral daily  mesalamine DR Capsule 400 milliGRAM(s) Oral three times a day  saccharomyces boulardii 250 milliGRAM(s) Oral two times a day  tamsulosin 0.4 milliGRAM(s) Oral at bedtime  tiotropium 18 MICROgram(s) Capsule 1 Capsule(s) Inhalation daily      LABS: All Labs Reviewed:                        12.6   5.23  )-----------( 194      ( 14 Feb 2020 05:37 )             38.5     02-14    146<H>  |  108  |  19  ----------------------------<  106<H>  3.6   |  29  |  0.95    Ca    9.4      14 Feb 2020 05:37            Blood Culture:     RADIOLOGY/EKG:    DVT PPX:    ADVANCED DIRECTIVE:    DISPOSITION:

## 2020-02-19 PROBLEM — E78.00 PURE HYPERCHOLESTEROLEMIA, UNSPECIFIED: Chronic | Status: ACTIVE | Noted: 2020-02-07

## 2020-02-19 PROBLEM — J44.9 CHRONIC OBSTRUCTIVE PULMONARY DISEASE, UNSPECIFIED: Chronic | Status: ACTIVE | Noted: 2020-02-07

## 2020-02-20 NOTE — CDI QUERY NOTE - NSCDIOTHERTXTBX_GEN_ALL_CORE_HH
ER - HR - 90 . WBC - 16.2.    H&P and notes until 2/10/2020 - severe sepsis secondary to community acquired pneumonia.    Last 2 notes and d/c note  fail to mention sepsis.    Please clarify;    - Sepsis resolved.  - Sepsis ruled out.      Thank you.

## 2020-02-28 ENCOUNTER — APPOINTMENT (OUTPATIENT)
Dept: PULMONOLOGY | Facility: CLINIC | Age: 75
End: 2020-02-28
Payer: MEDICARE

## 2020-02-28 VITALS
RESPIRATION RATE: 16 BRPM | HEART RATE: 63 BPM | HEIGHT: 74 IN | BODY MASS INDEX: 24.13 KG/M2 | TEMPERATURE: 97.7 F | OXYGEN SATURATION: 93 % | WEIGHT: 188 LBS | DIASTOLIC BLOOD PRESSURE: 80 MMHG | SYSTOLIC BLOOD PRESSURE: 120 MMHG

## 2020-02-28 DIAGNOSIS — J18.9 PNEUMONIA, UNSPECIFIED ORGANISM: ICD-10-CM

## 2020-02-28 DIAGNOSIS — J44.9 CHRONIC OBSTRUCTIVE PULMONARY DISEASE, UNSPECIFIED: ICD-10-CM

## 2020-02-28 DIAGNOSIS — R93.89 ABNORMAL FINDINGS ON DIAGNOSTIC IMAGING OF OTHER SPECIFIED BODY STRUCTURES: ICD-10-CM

## 2020-02-28 PROCEDURE — 99215 OFFICE O/P EST HI 40 MIN: CPT

## 2020-02-28 NOTE — PHYSICAL EXAM
[No Acute Distress] : no acute distress [Normal Oropharynx] : normal oropharynx [Normal Appearance] : normal appearance [No Neck Mass] : no neck mass [Normal Rate/Rhythm] : normal rate/rhythm [Normal S1, S2] : normal s1, s2 [No Murmurs] : no murmurs [No Resp Distress] : no resp distress [Clear to Auscultation Bilaterally] : clear to auscultation bilaterally [No Abnormalities] : no abnormalities [Benign] : benign [No Clubbing] : no clubbing [Normal Gait] : normal gait [No Cyanosis] : no cyanosis [FROM] : FROM [No Edema] : no edema [Normal Color/ Pigmentation] : normal color/ pigmentation [No Focal Deficits] : no focal deficits [Oriented x3] : oriented x3 [Normal Affect] : normal affect [TextBox_[id number]] : distant breath sounds bilaterally

## 2020-02-28 NOTE — REASON FOR VISIT
[Follow-Up - From Hospitalization] : a follow-up visit after a recent hospitalization [Pneumonia] : pneumonia [COPD] : COPD

## 2020-02-28 NOTE — REVIEW OF SYSTEMS
[Chest Tightness] : no chest tightness [Frequent URIs] : no frequent URIs [Hemoptysis] : no hemoptysis [Cough] : no cough [Dyspnea] : dyspnea [Sputum] : no sputum [Pleuritic Pain] : no pleuritic pain [Wheezing] : no wheezing [Negative] : Endocrine

## 2020-03-11 ENCOUNTER — RX RENEWAL (OUTPATIENT)
Age: 75
End: 2020-03-11

## 2020-03-13 ENCOUNTER — APPOINTMENT (OUTPATIENT)
Dept: CARDIOLOGY | Facility: CLINIC | Age: 75
End: 2020-03-13
Payer: MEDICARE

## 2020-03-13 ENCOUNTER — NON-APPOINTMENT (OUTPATIENT)
Age: 75
End: 2020-03-13

## 2020-03-13 VITALS
HEIGHT: 74 IN | WEIGHT: 188 LBS | OXYGEN SATURATION: 96 % | BODY MASS INDEX: 24.13 KG/M2 | SYSTOLIC BLOOD PRESSURE: 96 MMHG | DIASTOLIC BLOOD PRESSURE: 56 MMHG | RESPIRATION RATE: 16 BRPM | HEART RATE: 78 BPM

## 2020-03-13 DIAGNOSIS — I25.10 ATHEROSCLEROTIC HEART DISEASE OF NATIVE CORONARY ARTERY W/OUT ANGINA PECTORIS: ICD-10-CM

## 2020-03-13 DIAGNOSIS — R07.9 CHEST PAIN, UNSPECIFIED: ICD-10-CM

## 2020-03-13 PROCEDURE — 99214 OFFICE O/P EST MOD 30 MIN: CPT

## 2020-03-13 PROCEDURE — 93000 ELECTROCARDIOGRAM COMPLETE: CPT

## 2020-03-14 PROBLEM — R07.9 CHEST PAIN: Status: ACTIVE | Noted: 2020-03-14

## 2020-03-14 PROBLEM — I25.10 ARTERIOSCLEROSIS OF CORONARY ARTERY: Status: ACTIVE | Noted: 2020-03-14

## 2020-03-14 NOTE — REASON FOR VISIT
[FreeTextEntry1] : SADA tells me that on a Friday he couldn't get out of bed.  he comes today for clarification of      overall cardiovascular risk. he was advised to undergo a complete cardiac evaluation. he denies chest pains shortness of breath or loss of consciousness. while in hospital he had pleuritic like chest pains that were felt to be on the basis of a pneumonia, however he has significant coronary risk and a CTA is requested knowing limitations of a nuclear stress performed previously by dr Marion.

## 2020-03-23 ENCOUNTER — APPOINTMENT (OUTPATIENT)
Dept: CT IMAGING | Facility: HOSPITAL | Age: 75
End: 2020-03-23

## 2020-04-02 ENCOUNTER — APPOINTMENT (OUTPATIENT)
Dept: PULMONOLOGY | Facility: CLINIC | Age: 75
End: 2020-04-02

## 2020-04-18 ENCOUNTER — RX RENEWAL (OUTPATIENT)
Age: 75
End: 2020-04-18

## 2020-11-09 ENCOUNTER — RX RENEWAL (OUTPATIENT)
Age: 75
End: 2020-11-09

## 2020-12-16 ENCOUNTER — RX RENEWAL (OUTPATIENT)
Age: 75
End: 2020-12-16

## 2021-04-09 NOTE — PROGRESS NOTE ADULT - PROBLEM SELECTOR PLAN 5
RHEUMATOLOGY    Dr. Charles Bunn    [facility]  [address] [place] [address]    Our new phone number for Rheumatology is [phone], this number will be able to help you schedule appointments for Dr. Bunn or if you have any message you would like sent to us.    Thank you for choosing [facility]!    Theresa Clay [facility] Rheumatology                     resolved   Continue to monitor

## 2021-05-05 ENCOUNTER — RX RENEWAL (OUTPATIENT)
Age: 76
End: 2021-05-05

## 2021-06-15 ENCOUNTER — RX RENEWAL (OUTPATIENT)
Age: 76
End: 2021-06-15

## 2021-09-19 ENCOUNTER — RX RENEWAL (OUTPATIENT)
Age: 76
End: 2021-09-19

## 2022-01-13 ENCOUNTER — RX RENEWAL (OUTPATIENT)
Age: 77
End: 2022-01-13

## 2022-01-13 RX ORDER — ATORVASTATIN CALCIUM 20 MG/1
20 TABLET, FILM COATED ORAL
Qty: 90 | Refills: 1 | Status: ACTIVE | COMMUNITY
Start: 2019-05-27 | End: 1900-01-01

## 2022-02-11 NOTE — DISCUSSION/SUMMARY
[Coronary Artery Disease] : coronary artery disease [de-identified] : cardiac cta scan look at lungs as well. [FreeTextEntry1] : I have asked Peder to undergo detailed cardiac testing in order to evaluate his overall cardiovascular risk.\par \par Quality measures \par Tobacco intervention indicated\par Statin for prevention of cardiovascular disease \par Hypertension compensated\par Aspirin for ischemic vascular disease to be considered\par Tobacco screening cessation and intervention indicated\par \par Medical necessity\par This is a high encounter based upon two or more chronic illnesses with mild exacerbation requiring further management and evaluation.\par  Patient

## 2022-05-18 ENCOUNTER — RX RENEWAL (OUTPATIENT)
Age: 77
End: 2022-05-18

## 2022-05-18 RX ORDER — FLUDROCORTISONE ACETATE 0.1 MG/1
0.1 TABLET ORAL
Qty: 90 | Refills: 1 | Status: ACTIVE | COMMUNITY
Start: 2019-05-22 | End: 1900-01-01

## 2022-11-02 NOTE — PATIENT PROFILE ADULT - NSPROPTRIGHTNOTIFY_GEN_A_NUR
declines Mustarde Flap Text: The defect edges were debeveled with a #15 scalpel blade.  Given the size, depth and location of the defect and the proximity to free margins a Mustarde flap was deemed most appropriate.  Using a sterile surgical marker, an appropriate flap was drawn incorporating the defect. The area thus outlined was incised with a #15 scalpel blade.  The skin margins were undermined to an appropriate distance in all directions utilizing iris scissors.